# Patient Record
Sex: MALE | URBAN - METROPOLITAN AREA
[De-identification: names, ages, dates, MRNs, and addresses within clinical notes are randomized per-mention and may not be internally consistent; named-entity substitution may affect disease eponyms.]

---

## 2021-03-25 ENCOUNTER — INPATIENT (INPATIENT)
Facility: HOSPITAL | Age: 56
LOS: 4 days | Discharge: HOME | DRG: 246 | End: 2021-03-30
Attending: INTERNAL MEDICINE | Admitting: STUDENT IN AN ORGANIZED HEALTH CARE EDUCATION/TRAINING PROGRAM
Payer: MEDICAID

## 2021-03-25 VITALS
HEART RATE: 106 BPM | WEIGHT: 181.66 LBS | TEMPERATURE: 98 F | SYSTOLIC BLOOD PRESSURE: 129 MMHG | DIASTOLIC BLOOD PRESSURE: 83 MMHG | RESPIRATION RATE: 20 BRPM | HEIGHT: 66 IN | OXYGEN SATURATION: 98 %

## 2021-03-25 DIAGNOSIS — I21.4 NON-ST ELEVATION (NSTEMI) MYOCARDIAL INFARCTION: ICD-10-CM

## 2021-03-25 DIAGNOSIS — Z95.2 PRESENCE OF PROSTHETIC HEART VALVE: Chronic | ICD-10-CM

## 2021-03-25 LAB
A1C WITH ESTIMATED AVERAGE GLUCOSE RESULT: 5.9 % — HIGH (ref 4–5.6)
ALBUMIN SERPL ELPH-MCNC: 4.3 G/DL — SIGNIFICANT CHANGE UP (ref 3.3–5)
ALP SERPL-CCNC: 59 U/L — SIGNIFICANT CHANGE UP (ref 40–120)
ALT FLD-CCNC: 25 U/L — SIGNIFICANT CHANGE UP (ref 10–45)
ANION GAP SERPL CALC-SCNC: 13 MMOL/L — SIGNIFICANT CHANGE UP (ref 5–17)
APTT BLD: >200 SEC — CRITICAL HIGH (ref 27.5–35.5)
AST SERPL-CCNC: 40 U/L — SIGNIFICANT CHANGE UP (ref 10–40)
BILIRUB SERPL-MCNC: 1.2 MG/DL — SIGNIFICANT CHANGE UP (ref 0.2–1.2)
BLD GP AB SCN SERPL QL: NEGATIVE — SIGNIFICANT CHANGE UP
BUN SERPL-MCNC: 13 MG/DL — SIGNIFICANT CHANGE UP (ref 7–23)
CALCIUM SERPL-MCNC: 8.6 MG/DL — SIGNIFICANT CHANGE UP (ref 8.4–10.5)
CHLORIDE SERPL-SCNC: 102 MMOL/L — SIGNIFICANT CHANGE UP (ref 96–108)
CHOLEST SERPL-MCNC: 143 MG/DL — SIGNIFICANT CHANGE UP
CK MB BLD-MCNC: 3 % — SIGNIFICANT CHANGE UP (ref 0–3.5)
CK MB CFR SERPL CALC: 15.6 NG/ML — HIGH (ref 0–6.7)
CK SERPL-CCNC: 518 U/L — HIGH (ref 30–200)
CO2 SERPL-SCNC: 22 MMOL/L — SIGNIFICANT CHANGE UP (ref 22–31)
COVID-19 NUCLEOCAPSID GAM AB INTERP: NEGATIVE — SIGNIFICANT CHANGE UP
COVID-19 NUCLEOCAPSID TOTAL GAM ANTIBODY RESULT: 0.08 INDEX — SIGNIFICANT CHANGE UP
CREAT SERPL-MCNC: 0.77 MG/DL — SIGNIFICANT CHANGE UP (ref 0.5–1.3)
ESTIMATED AVERAGE GLUCOSE: 123 MG/DL — HIGH (ref 68–114)
GLUCOSE BLDC GLUCOMTR-MCNC: 110 MG/DL — HIGH (ref 70–99)
GLUCOSE BLDC GLUCOMTR-MCNC: 124 MG/DL — HIGH (ref 70–99)
GLUCOSE BLDC GLUCOMTR-MCNC: 127 MG/DL — HIGH (ref 70–99)
GLUCOSE SERPL-MCNC: 119 MG/DL — HIGH (ref 70–99)
HCT VFR BLD CALC: 43.7 % — SIGNIFICANT CHANGE UP (ref 39–50)
HDLC SERPL-MCNC: 48 MG/DL — SIGNIFICANT CHANGE UP
HGB BLD-MCNC: 13.6 G/DL — SIGNIFICANT CHANGE UP (ref 13–17)
LACTATE SERPL-SCNC: 1.5 MMOL/L — SIGNIFICANT CHANGE UP (ref 0.7–2)
LIPID PNL WITH DIRECT LDL SERPL: 82 MG/DL — SIGNIFICANT CHANGE UP
MAGNESIUM SERPL-MCNC: 2 MG/DL — SIGNIFICANT CHANGE UP (ref 1.6–2.6)
MCHC RBC-ENTMCNC: 25.4 PG — LOW (ref 27–34)
MCHC RBC-ENTMCNC: 31.1 GM/DL — LOW (ref 32–36)
MCV RBC AUTO: 81.7 FL — SIGNIFICANT CHANGE UP (ref 80–100)
NON HDL CHOLESTEROL: 95 MG/DL — SIGNIFICANT CHANGE UP
NRBC # BLD: 0 /100 WBCS — SIGNIFICANT CHANGE UP (ref 0–0)
PHOSPHATE SERPL-MCNC: 2.5 MG/DL — SIGNIFICANT CHANGE UP (ref 2.5–4.5)
PLATELET # BLD AUTO: 246 K/UL — SIGNIFICANT CHANGE UP (ref 150–400)
POTASSIUM SERPL-MCNC: 3.8 MMOL/L — SIGNIFICANT CHANGE UP (ref 3.5–5.3)
POTASSIUM SERPL-SCNC: 3.8 MMOL/L — SIGNIFICANT CHANGE UP (ref 3.5–5.3)
PROT SERPL-MCNC: 7.3 G/DL — SIGNIFICANT CHANGE UP (ref 6–8.3)
RBC # BLD: 5.35 M/UL — SIGNIFICANT CHANGE UP (ref 4.2–5.8)
RBC # FLD: 14.2 % — SIGNIFICANT CHANGE UP (ref 10.3–14.5)
RH IG SCN BLD-IMP: POSITIVE — SIGNIFICANT CHANGE UP
SARS-COV-2 IGG+IGM SERPL QL IA: 0.08 INDEX — SIGNIFICANT CHANGE UP
SARS-COV-2 IGG+IGM SERPL QL IA: NEGATIVE — SIGNIFICANT CHANGE UP
SARS-COV-2 RNA SPEC QL NAA+PROBE: SIGNIFICANT CHANGE UP
SODIUM SERPL-SCNC: 137 MMOL/L — SIGNIFICANT CHANGE UP (ref 135–145)
TRIGL SERPL-MCNC: 66 MG/DL — SIGNIFICANT CHANGE UP
TROPONIN T, HIGH SENSITIVITY RESULT: 166 NG/L — HIGH (ref 0–51)
TSH SERPL-MCNC: 1.2 UIU/ML — SIGNIFICANT CHANGE UP (ref 0.27–4.2)
WBC # BLD: 7.78 K/UL — SIGNIFICANT CHANGE UP (ref 3.8–10.5)
WBC # FLD AUTO: 7.78 K/UL — SIGNIFICANT CHANGE UP (ref 3.8–10.5)

## 2021-03-25 PROCEDURE — 92978 ENDOLUMINL IVUS OCT C 1ST: CPT | Mod: 26,RC

## 2021-03-25 PROCEDURE — 93458 L HRT ARTERY/VENTRICLE ANGIO: CPT | Mod: 26,59

## 2021-03-25 PROCEDURE — 92941 PRQ TRLML REVSC TOT OCCL AMI: CPT | Mod: RC

## 2021-03-25 PROCEDURE — 99223 1ST HOSP IP/OBS HIGH 75: CPT

## 2021-03-25 PROCEDURE — 93010 ELECTROCARDIOGRAM REPORT: CPT

## 2021-03-25 PROCEDURE — 99291 CRITICAL CARE FIRST HOUR: CPT

## 2021-03-25 PROCEDURE — 92973 PRQ TRLUML C MCHN ASP THRMBC: CPT | Mod: RC

## 2021-03-25 PROCEDURE — 99152 MOD SED SAME PHYS/QHP 5/>YRS: CPT

## 2021-03-25 RX ORDER — HEPARIN SODIUM 5000 [USP'U]/ML
INJECTION INTRAVENOUS; SUBCUTANEOUS
Qty: 25000 | Refills: 0 | Status: DISCONTINUED | OUTPATIENT
Start: 2021-03-25 | End: 2021-03-30

## 2021-03-25 RX ORDER — CHLORHEXIDINE GLUCONATE 213 G/1000ML
1 SOLUTION TOPICAL
Refills: 0 | Status: DISCONTINUED | OUTPATIENT
Start: 2021-03-25 | End: 2021-03-30

## 2021-03-25 RX ORDER — ASPIRIN/CALCIUM CARB/MAGNESIUM 324 MG
81 TABLET ORAL DAILY
Refills: 0 | Status: DISCONTINUED | OUTPATIENT
Start: 2021-03-26 | End: 2021-03-30

## 2021-03-25 RX ORDER — EPTIFIBATIDE 2 MG/ML
1.97 INJECTION, SOLUTION INTRAVENOUS
Qty: 75 | Refills: 0 | Status: DISCONTINUED | OUTPATIENT
Start: 2021-03-25 | End: 2021-03-25

## 2021-03-25 RX ORDER — CLOPIDOGREL BISULFATE 75 MG/1
300 TABLET, FILM COATED ORAL ONCE
Refills: 0 | Status: COMPLETED | OUTPATIENT
Start: 2021-03-26 | End: 2021-03-26

## 2021-03-25 RX ORDER — INSULIN LISPRO 100/ML
VIAL (ML) SUBCUTANEOUS
Refills: 0 | Status: DISCONTINUED | OUTPATIENT
Start: 2021-03-25 | End: 2021-03-30

## 2021-03-25 RX ORDER — POTASSIUM CHLORIDE 20 MEQ
40 PACKET (EA) ORAL ONCE
Refills: 0 | Status: COMPLETED | OUTPATIENT
Start: 2021-03-25 | End: 2021-03-25

## 2021-03-25 RX ORDER — INSULIN LISPRO 100/ML
VIAL (ML) SUBCUTANEOUS AT BEDTIME
Refills: 0 | Status: DISCONTINUED | OUTPATIENT
Start: 2021-03-25 | End: 2021-03-30

## 2021-03-25 RX ORDER — AMIODARONE HYDROCHLORIDE 400 MG/1
200 TABLET ORAL DAILY
Refills: 0 | Status: DISCONTINUED | OUTPATIENT
Start: 2021-03-25 | End: 2021-03-26

## 2021-03-25 RX ORDER — ATORVASTATIN CALCIUM 80 MG/1
80 TABLET, FILM COATED ORAL AT BEDTIME
Refills: 0 | Status: DISCONTINUED | OUTPATIENT
Start: 2021-03-25 | End: 2021-03-30

## 2021-03-25 RX ORDER — CLOPIDOGREL BISULFATE 75 MG/1
75 TABLET, FILM COATED ORAL DAILY
Refills: 0 | Status: DISCONTINUED | OUTPATIENT
Start: 2021-03-27 | End: 2021-03-30

## 2021-03-25 RX ORDER — INSULIN LISPRO 100/ML
VIAL (ML) SUBCUTANEOUS
Refills: 0 | Status: DISCONTINUED | OUTPATIENT
Start: 2021-03-25 | End: 2021-03-25

## 2021-03-25 RX ADMIN — Medication 40 MILLIEQUIVALENT(S): at 17:28

## 2021-03-25 RX ADMIN — ATORVASTATIN CALCIUM 80 MILLIGRAM(S): 80 TABLET, FILM COATED ORAL at 22:27

## 2021-03-25 RX ADMIN — HEPARIN SODIUM 1500 UNIT(S)/HR: 5000 INJECTION INTRAVENOUS; SUBCUTANEOUS at 22:21

## 2021-03-25 RX ADMIN — EPTIFIBATIDE 13 MICROGRAM(S)/KG/MIN: 2 INJECTION, SOLUTION INTRAVENOUS at 14:29

## 2021-03-25 RX ADMIN — AMIODARONE HYDROCHLORIDE 200 MILLIGRAM(S): 400 TABLET ORAL at 15:26

## 2021-03-25 NOTE — CHART NOTE - NSCHARTNOTEFT_GEN_A_CORE
Padua Prediction Score for VTE Risk within 24hours of admission:    Active malignancy:                                                    [  ] YES +3, [ x ] NO   Previous VTE (Excluding Superficial Vein Thrombosis): [  ] YES +3, [ x ] NO  Reduced mobility:                                                     [  ] YES +3, [ x ] NO  Already known thrombophilic condition:                     [  ] YES +3, [ x ] NO  Recent (</=1 month trauma and/or surgery):             [  ] YES +2, [ x ] NO  Elderly age (>/=70):                                                  [  ] YES +1, [ x ] NO  Heart and/or Respiratory Failure:                               [  ] YES +1, [ x ] NO   Acute MI and/or ischemic CVA:                                  [ x ] YES +1, [  ] NO   Acute infection and/or rheumatologic disorder:          [  ] YES +1, [ x ] NO   BMI>/= 30:                                                              [  ] YES +1, [ x ] NO   Ongoing hormonal treatment:                                   [  ] YES +1, [ x ] NO    Total Score: [ 1 ]  points    [ x ] Padua Score <  3: Low Risk of VTE         - Chemical Thromboprophylaxis should be considered on case-by-case basis  [  ] Padua Score >/= 4: High Risk of VTE         - Chemical Thromboprophylaxis is recommended for nonpregnant patients without contraindications (Major bleeding, thrombocytopenia) who are >/=  18 years of age                         VTE Prophylaxis Recommendations:  Mechanical Pneumatic Compression Devices                                [  ]  Yes,  [  ] No, Contraindicated    Chemical VTE Prophylaxis (Heparin/ Lovenox/ Fondaparinux)        [   ] Yes,  [  ] No              [ ] Contraindicated, because _____              [x] Already receiving Systemic Anticoagulation: Heparin gtt to start @ 10pm 3/25

## 2021-03-25 NOTE — H&P ADULT - NSHPREVIEWOFSYSTEMS_GEN_ALL_CORE
REVIEW OF SYSTEMS  --------------------------------------------------------------------------------  Gen: No weight changes, + fatigue, fevers/chills, weakness  Skin: No rashes  Head/Eyes/Ears/Mouth: No headache; Normal hearing; Normal vision w/o blurriness; No sinus pain/discomfort, sore throat  Respiratory: +dyspnea, cough, wheezing, hemoptysis  CV: +chest pain, -PND, -orthopnea  GI: No abdominal pain, diarrhea, constipation, +nausea, vomiting, melena, hematochezia  : No increased frequency, dysuria, hematuria, nocturia  MSK: No joint pain/swelling; no back pain; no edema  Neuro: No dizziness/lightheadedness, weakness, seizures, numbness, tingling  Heme: No easy bruising or bleeding  Endo: No heat/cold intolerance  Psych: No significant nervousness, anxiety, stress, depression

## 2021-03-25 NOTE — H&P ADULT - NSICDXPASTMEDICALHX_GEN_ALL_CORE_FT
PAST MEDICAL HISTORY:  Afib     Diabetes     HLD (hyperlipidemia)     HTN (hypertension)     RHODA on CPAP

## 2021-03-25 NOTE — H&P ADULT - NSHPLABSRESULTS_GEN_ALL_CORE
LABS: LABS:  03-25 @ 14:57 Creatine 518 U/L<H> [30 - 200]  cret                        13.6   7.78  )-----------( 246      ( 25 Mar 2021 14:57 )             43.7     03-25    137  |  102  |  13  ----------------------------<  119<H>  3.8   |  22  |  0.77    Ca    8.6      25 Mar 2021 14:57  Phos  2.5     03-25  Mg     2.0     03-25    TPro  7.3  /  Alb  4.3  /  TBili  1.2  /  DBili  x   /  AST  40  /  ALT  25  /  AlkPhos  59  03-25    Troponin T, High Sensitivity Result: 166: Specimen not hemolyzed CKMB Units: 15.6 ng/mL (03.25.21 @ 14:57)

## 2021-03-25 NOTE — PROGRESS NOTE ADULT - SUBJECTIVE AND OBJECTIVE BOX
HOLLEY TINOCO  MRN-[id number]  Patient is a 56y old  Male who presents with a chief complaint of CAD.    HPI:  The patient is a 56 year old male, PMH HTN, HLD, DM, RHODA on CPAP, RHD s/p mitral valve replacement in 2002 and afib on coumadin, presenting to CCU following a RCA KALIN and thrombectomy. The patient was in his usual state of health until yesterday evening, when he felt very fatigued. This morning when he awoke, he was feeling significant chest pain which he describes as burning. He also felt short of breath and very nauseous, but did not vomit. He took a cab to the [facility] ED. He was found to be tachycardic to 129, EKG with ST elevations in interior leads. He was given morphine, aspirin 325, Brilinta and nitroglycerin. He was transferred to [facility], where was found to have 100% occlusion in the proximal RCA. He received one KALIN and a thrombectomy.  (25 Mar 2021 14:06)      Hospital Course:  3/25 Admitted to the CICU for STEMI S/P KALIN to the proximal RCA and thrombectomy.     24 HOUR EVENTS:  - S/P KALIN to the proximal RCA and thrombectomy today.     REVIEW OF SYSTEMS:   Constitutional: No fevers, or chills  Eyes/ENT: No visual changes  Respiratory: No cough, wheezing, hemoptysis  Cardiovascular: +chest pain, no palpitations  Gastrointestinal: No abdominal pain.  Genitourinary: No dysuria  Neurological: No numbness, no weakness  Skin: No itching, rashes    ICU Vital Signs Last 24 Hrs  T(C): 36.5 (25 Mar 2021 17:00), Max: 36.6 (25 Mar 2021 13:21)  T(F): 97.7 (25 Mar 2021 17:00), Max: 97.9 (25 Mar 2021 13:21)  HR: 104 (25 Mar 2021 18:30) (62 - 124)  BP: 107/75 (25 Mar 2021 18:30) (96/71 - 129/83)  BP(mean): 85 (25 Mar 2021 18:30) (79 - 108)  ABP: --  ABP(mean): --  RR: 23 (25 Mar 2021 18:30) (16 - 24)  SpO2: 99% (25 Mar 2021 18:30) (97% - 99%)    I&O's Summary    25 Mar 2021 07:01  -  25 Mar 2021 18:47  --------------------------------------------------------  IN: 506 mL / OUT: 700 mL / NET: -194 mL    POCT Blood Glucose.: 124 mg/dL (25 Mar 2021 17:18)    PHYSICAL EXAM:   General: No acute distress  Eyes: EOMI, PERRLA, conjunctiva and sclera clear  Chest/Lung: CTAB, no wheezes, rales, or rhonchi  Heart: Regular rate, regular rhythm. Normal S1/S2. No murmurs, rubs, or gallops.  Abdomen: Soft, nontender, nondistended. Normal bowel sounds.  Extremites: 2+ peripheral pulses B/L. No clubbing, cyanosis, or edema.  Neurology: A&O x3, no focal deficits  Skin: No rashes or lesions  ============================I/O===========================   I&O's Detail    25 Mar 2021 07:01  -  25 Mar 2021 18:47  --------------------------------------------------------  IN:    Eptifbatide: 26 mL    Oral Fluid: 480 mL  Total IN: 506 mL    OUT:    Voided (mL): 700 mL  Total OUT: 700 mL    Total NET: -194 mL        ============================ LABS =========================                        13.6   7.78  )-----------( 246      ( 25 Mar 2021 14:57 )             43.7     03-25    137  |  102  |  13  ----------------------------<  119<H>  3.8   |  22  |  0.77    Ca    8.6      25 Mar 2021 14:57  Phos  2.5     03-25  Mg     2.0     03-25    TPro  7.3  /  Alb  4.3  /  TBili  1.2  /  DBili  x   /  AST  40  /  ALT  25  /  AlkPhos  59  03-25    LIVER FUNCTIONS - ( 25 Mar 2021 14:57 )  Alb: 4.3 g/dL / Pro: 7.3 g/dL / ALK PHOS: 59 U/L / ALT: 25 U/L / AST: 40 U/L / GGT: x               ======================Micro/Rad/Cardio=================  Telemetry: Reviewed   EKG: Reviewed  CXR: Reviewed  Cath: Reviewed  ======================================================  PAST MEDICAL & SURGICAL HISTORY:  RHODA on CPAP    HLD (hyperlipidemia)    Diabetes    HTN (hypertension)    Afib    H/O mitral valve replacement      ====================ASSESSMENT ==============  STEMI S/P RCA KALIN and thrombectomy on 3/25  DMT2 w/ Hyperglycemia  AFib    Plan:  ====================== NEUROLOGY=====================  A&Ox3, nonfocal  - Continue to monitor neuro status as per protocol     ==================== RESPIRATORY======================  Comfortable on room air, SpO2 99%  - Continue to monitor SpO2 via pulse oximetry  - Encourage bedside spirometry     ====================CARDIOVASCULAR==================  STEMI  - s/p KALIN to proximal RCA and thrombectomy  - Cont with DAPT: ASA, Brilinta with Lipitor 80mg QHS  - Trop 166, continue to trend CE until downtrending   - F/UP formal TTE results    Afib   - C/w home med Amiodarone 200mg QD  - C/w A/C with Heparin    aMIOdarone    Tablet 200 milliGRAM(s) Oral daily  atorvastatin 80 milliGRAM(s) Oral at bedtime  ===================HEMATOLOGIC/ONC ===================  H/H 13.6/43.7.   - Continue to monitor hemoglobin and hematocrit levels.     VTE prophylaxis   - Continue Heparin gtt  - Monitor PTT levels    heparin  Infusion.  Unit(s)/Hr (15 mL/Hr) IV Continuous <Continuous>  ===================== RENAL =========================  No active issues  - Continue to monitor I/Os, BUN/Creatinine, and urine output.   - Goal net negative fluid balance. Replete lytes PRN. Keep K> 4 and Mg >2.     ==================== GASTROINTESTINAL===================  Tolerating PO DASH/TLC diet.     =======================    ENDOCRINE  =====================  Hx of DMT2  - glycemic control with Admelog sliding scale.   - Monitor blood glucose levels.   - F/UP A1c level    insulin lispro (ADMELOG) corrective regimen sliding scale   SubCutaneous three times a day before meals  ========================INFECTIOUS DISEASE================  Afebrile, WBC within normal limits.   - Monitor temperature and trend WBC.   - Monitor off abx.       Patient requires continuous monitoring with bedside rhythm monitoring, pulse ox monitoring, and intermittent blood gas analysis. Care plan discussed with ICU care team. Patient remained critical and at risk for life threatening decompensation.  Patient seen, examined and plan discussed with CCU team during rounds.     I have personally provided 35 minutes of critical care time excluding time spent on separate procedures.    By signing my name below, I, Sandra Callejas, attest that this documentation has been prepared under the direction and in the presence of OZIEL Gonzalez.  Electronically signed: Eduardo Aparicio, 03-25-21 @ 18:47    I, OZIEL Gonzalez, personally performed the services described in this documentation. all medical record entries made by the scribe were at my direction and in my presence. I have reviewed the chart and agree that the record reflects my personal performance and is accurate and complete  Electronically signed: OZIEL Gonzalez.       HOLLEY TINOCO  MRN-[id number]  Patient is a 56y old  Male who presents with a chief complaint of CAD.    HPI:  The patient is a 56 year old male, PMH HTN, HLD, DM, RHODA on CPAP, RHD s/p mitral valve replacement in 2002 and afib on coumadin, presenting to CCU following a RCA KALIN and thrombectomy. The patient was in his usual state of health until yesterday evening, when he felt very fatigued. This morning when he awoke, he was feeling significant chest pain which he describes as burning. He also felt short of breath and very nauseous, but did not vomit. He took a cab to the [facility] ED. He was found to be tachycardic to 129, EKG with ST elevations in interior leads. He was given morphine, aspirin 325, Brilinta and nitroglycerin. He was transferred to [facility], where was found to have 100% occlusion in the proximal RCA. He received one KALIN and a thrombectomy.  (25 Mar 2021 14:06)      Hospital Course:  3/25 Admitted to the CICU for STEMI S/P KALIN to the proximal RCA    24 HOUR EVENTS:  - S/P KALIN to the proximal RCA and thrombectomy today.     REVIEW OF SYSTEMS:   Constitutional: No fevers, or chills  Eyes/ENT: No visual changes  Respiratory: No cough, wheezing, hemoptysis  Cardiovascular: +chest pain, no palpitations  Gastrointestinal: No abdominal pain.  Genitourinary: No dysuria  Neurological: No numbness, no weakness  Skin: No itching, rashes    ICU Vital Signs Last 24 Hrs  T(C): 36.5 (25 Mar 2021 17:00), Max: 36.6 (25 Mar 2021 13:21)  T(F): 97.7 (25 Mar 2021 17:00), Max: 97.9 (25 Mar 2021 13:21)  HR: 104 (25 Mar 2021 18:30) (62 - 124)  BP: 107/75 (25 Mar 2021 18:30) (96/71 - 129/83)  BP(mean): 85 (25 Mar 2021 18:30) (79 - 108)  ABP: --  ABP(mean): --  RR: 23 (25 Mar 2021 18:30) (16 - 24)  SpO2: 99% (25 Mar 2021 18:30) (97% - 99%)    I&O's Summary    25 Mar 2021 07:01  -  25 Mar 2021 18:47  --------------------------------------------------------  IN: 506 mL / OUT: 700 mL / NET: -194 mL    POCT Blood Glucose.: 124 mg/dL (25 Mar 2021 17:18)    PHYSICAL EXAM:   General: No acute distress  Eyes: EOMI, PERRLA, conjunctiva and sclera clear  Chest/Lung: CTAB, no wheezes, rales, or rhonchi  Heart: Regular rate, regular rhythm. Normal S1/S2. No murmurs, rubs, or gallops.  Abdomen: Soft, nontender, nondistended. Normal bowel sounds.  Extremites: 2+ peripheral pulses B/L. No clubbing, cyanosis, or edema.  Neurology: A&O x3, no focal deficits  Skin: No rashes or lesions  ============================I/O===========================   I&O's Detail    25 Mar 2021 07:01  -  25 Mar 2021 18:47  --------------------------------------------------------  IN:    Eptifbatide: 26 mL    Oral Fluid: 480 mL  Total IN: 506 mL    OUT:    Voided (mL): 700 mL  Total OUT: 700 mL    Total NET: -194 mL        ============================ LABS =========================                        13.6   7.78  )-----------( 246      ( 25 Mar 2021 14:57 )             43.7     03-25    137  |  102  |  13  ----------------------------<  119<H>  3.8   |  22  |  0.77    Ca    8.6      25 Mar 2021 14:57  Phos  2.5     03-25  Mg     2.0     03-25    TPro  7.3  /  Alb  4.3  /  TBili  1.2  /  DBili  x   /  AST  40  /  ALT  25  /  AlkPhos  59  03-25    LIVER FUNCTIONS - ( 25 Mar 2021 14:57 )  Alb: 4.3 g/dL / Pro: 7.3 g/dL / ALK PHOS: 59 U/L / ALT: 25 U/L / AST: 40 U/L / GGT: x               ======================Micro/Rad/Cardio=================  Telemetry: Reviewed   EKG: Reviewed  CXR: Reviewed  Cath: Reviewed  ======================================================  PAST MEDICAL & SURGICAL HISTORY:  RHODA on CPAP    HLD (hyperlipidemia)    Diabetes    HTN (hypertension)    Afib    H/O mitral valve replacement      ====================ASSESSMENT ==============  STEMI S/P RCA KALIN and thrombectomy on 3/25  DMT2 w/ Hyperglycemia  AFib    Plan:  ====================== NEUROLOGY=====================  A&Ox3, nonfocal  - Continue to monitor neuro status as per protocol     ==================== RESPIRATORY======================  Comfortable on room air, SpO2 99%  - Continue to monitor SpO2 via pulse oximetry  - Encourage bedside spirometry     ====================CARDIOVASCULAR==================  STEMI  - s/p KALIN to proximal RCA and thrombectomy  - Cont with DAPT: ASA, Brilinta with Lipitor 80mg QHS  - Trop 166, continue to trend CE until downtrending   - F/UP formal TTE results    Afib   - C/w home med Amiodarone 200mg QD  - C/w A/C with Heparin    aMIOdarone    Tablet 200 milliGRAM(s) Oral daily  atorvastatin 80 milliGRAM(s) Oral at bedtime  ===================HEMATOLOGIC/ONC ===================  H/H 13.6/43.7.   - Continue to monitor hemoglobin and hematocrit levels.     VTE prophylaxis   - Continue Heparin gtt  - Monitor PTT levels    heparin  Infusion.  Unit(s)/Hr (15 mL/Hr) IV Continuous <Continuous>  ===================== RENAL =========================  No active issues  - Continue to monitor I/Os, BUN/Creatinine, and urine output.   - Goal net negative fluid balance. Replete lytes PRN. Keep K> 4 and Mg >2.     ==================== GASTROINTESTINAL===================  Tolerating PO DASH/TLC diet.     =======================    ENDOCRINE  =====================  Hx of DMT2  - glycemic control with Admelog sliding scale.   - Monitor blood glucose levels.   - F/UP A1c level    insulin lispro (ADMELOG) corrective regimen sliding scale   SubCutaneous three times a day before meals  ========================INFECTIOUS DISEASE================  Afebrile, WBC within normal limits.   - Monitor temperature and trend WBC.   - Monitor off abx.       Patient requires continuous monitoring with bedside rhythm monitoring, pulse ox monitoring, and intermittent blood gas analysis. Care plan discussed with ICU care team. Patient remained critical and at risk for life threatening decompensation.  Patient seen, examined and plan discussed with CCU team during rounds.     I have personally provided 35 minutes of critical care time excluding time spent on separate procedures.    By signing my name below, I, Sandra Callejas, attest that this documentation has been prepared under the direction and in the presence of OZIEL Gonzalez.  Electronically signed: Eduardo Aparicio, 03-25-21 @ 18:47    I, OZIEL Gonzalez, personally performed the services described in this documentation. all medical record entries made by the jessicaibe were at my direction and in my presence. I have reviewed the chart and agree that the record reflects my personal performance and is accurate and complete  Electronically signed: OZIEL Gonzalez.       HOLLEY TINOCO  MRN-[id number]  Patient is a 56y old  Male who presents with a chief complaint of CAD.    HPI:  The patient is a 56 year old male, PMH HTN, HLD, DM, RHODA on CPAP, RHD s/p mitral valve replacement in 2002 and afib on coumadin, presenting to CCU following a RCA KALIN and thrombectomy. The patient was in his usual state of health until yesterday evening, when he felt very fatigued. This morning when he awoke, he was feeling significant chest pain which he describes as burning. He also felt short of breath and very nauseous, but did not vomit. He took a cab to the [facility] ED. He was found to be tachycardic to 129, EKG with ST elevations in interior leads. He was given morphine, aspirin 325, Brilinta and nitroglycerin. He was transferred to [facility], where was found to have 100% occlusion in the proximal RCA. He received one KALIN and a thrombectomy.  (25 Mar 2021 14:06)      Hospital Course:  3/25 Admitted to the CICU for STEMI S/P KALIN to the proximal RCA    24 HOUR EVENTS:  - S/P KALIN to the proximal RCA and thrombectomy today.     REVIEW OF SYSTEMS:   Constitutional: No fevers, or chills  Respiratory: No cough, wheezing, hemoptysis  Cardiovascular: chest pain, no palpitations  Gastrointestinal: No abdominal pain.  Genitourinary: No dysuria  Neurological: No numbness, no weakness  Skin: No itching, rashes    ICU Vital Signs Last 24 Hrs  T(C): 36.5 (25 Mar 2021 17:00), Max: 36.6 (25 Mar 2021 13:21)  T(F): 97.7 (25 Mar 2021 17:00), Max: 97.9 (25 Mar 2021 13:21)  HR: 104 (25 Mar 2021 18:30) (62 - 124)  BP: 107/75 (25 Mar 2021 18:30) (96/71 - 129/83)  BP(mean): 85 (25 Mar 2021 18:30) (79 - 108)  ABP: --  ABP(mean): --  RR: 23 (25 Mar 2021 18:30) (16 - 24)  SpO2: 99% (25 Mar 2021 18:30) (97% - 99%)    I&O's Summary    25 Mar 2021 07:01  -  25 Mar 2021 18:47  --------------------------------------------------------  IN: 506 mL / OUT: 700 mL / NET: -194 mL    POCT Blood Glucose.: 124 mg/dL (25 Mar 2021 17:18)    PHYSICAL EXAM:   General: No acute distress  Chest/Lung: CTAB, no wheezes, rales, or rhonchi  Heart: Aflutter low 100s  Normal S1/S2. No murmurs, rubs, or gallops.  Abdomen: Soft, nontender, nondistended. Normal bowel sounds.  Extremites: R Femoral access site s/p sheath pull, no hematoma, ecchymosis. Dressing c/d/i. 2+ peripheral pulses B/L. No clubbing, cyanosis, or edema.  Neurology: A&O x3, no focal deficits  Skin: No rashes or lesions  ============================I/O===========================   I&O's Detail    25 Mar 2021 07:01  -  25 Mar 2021 18:47  --------------------------------------------------------  IN:    Eptifbatide: 26 mL    Oral Fluid: 480 mL  Total IN: 506 mL    OUT:    Voided (mL): 700 mL  Total OUT: 700 mL    Total NET: -194 mL        ============================ LABS =========================                        13.6   7.78  )-----------( 246      ( 25 Mar 2021 14:57 )             43.7     03-25    137  |  102  |  13  ----------------------------<  119<H>  3.8   |  22  |  0.77    Ca    8.6      25 Mar 2021 14:57  Phos  2.5     03-25  Mg     2.0     03-25    TPro  7.3  /  Alb  4.3  /  TBili  1.2  /  DBili  x   /  AST  40  /  ALT  25  /  AlkPhos  59  03-25    LIVER FUNCTIONS - ( 25 Mar 2021 14:57 )  Alb: 4.3 g/dL / Pro: 7.3 g/dL / ALK PHOS: 59 U/L / ALT: 25 U/L / AST: 40 U/L / GGT: x               ======================Micro/Rad/Cardio=================  Telemetry: Reviewed   EKG: Reviewed  CXR: Reviewed  Cath: Reviewed  ======================================================  PAST MEDICAL & SURGICAL HISTORY:  RHODA on CPAP    HLD (hyperlipidemia)    Diabetes    HTN (hypertension)    Afib    H/O mitral valve replacement      ====================ASSESSMENT ==============  STEMI S/P RCA KALIN and thrombectomy on 3/25  DMT2 w/ Hyperglycemia  AFib    Plan:  ====================== NEUROLOGY=====================  A&Ox3, nonfocal  - Continue to monitor neuro status as per protocol     ==================== RESPIRATORY======================  Comfortable on room air, SpO2 99%  - Hx of RHODA on CPAP @ home x5yrs, has had machine malfunction and has not used in past 2 mths. Does not wish to use the hospital equipment overnight.    - Continue to monitor SpO2 via pulse oximetry    ====================CARDIOVASCULAR==================  IWSTEMI  - [facility] 3/25: pRCA 100% KALIN x1 + integrillin gtt   - Cont with DAPT: ASA, and reload on plavix in AM in setting of Triple Therapy (Mechanical Valve)  - High dose statin daily   - GDMT as tolerated (BB, ACEi or ARB)   - TTE, Trend CE     Mechanical Mitral Valve Replacement 2002   - On coumadin @ home  - now s/p MI, will require triple therapy -> ASA/Plavix/Coumadin upon d/c  - Maintain on heparin gtt overnight 3/25    Afib   - C/w home med Amiodarone 200mg QD  - BB as BP tolerates  - C/w A/C with Heparin    aMIOdarone    Tablet 200 milliGRAM(s) Oral daily  atorvastatin 80 milliGRAM(s) Oral at bedtime  ===================HEMATOLOGIC/ONC ===================  H/H 13.6/43.7.   - Continue to monitor hemoglobin and hematocrit levels.     VTE prophylaxis   - Continue Heparin gtt  - Monitor PTT levels    heparin  Infusion.  Unit(s)/Hr (15 mL/Hr) IV Continuous <Continuous>  ===================== RENAL =========================  No active issues  - Continue to monitor I/Os, BUN/Creatinine, and urine output.   - Goal net negative fluid balance. Replete lytes PRN. Keep K> 4 and Mg >2.     ==================== GASTROINTESTINAL===================  PO DASH/TLC diet.   Bowel regimen PRN    =======================    ENDOCRINE  =====================  Hx of DMT2  - glycemic control with Admelog sliding scale.   - Monitor blood glucose levels.   - A1c 5.9     TSH normal    insulin lispro (ADMELOG) corrective regimen sliding scale   SubCutaneous three times a day before meals  ========================INFECTIOUS DISEASE================  Afebrile, WBC within normal limits.   - Monitor temperature and trend WBC.   - Monitor off abx.       Patient requires continuous monitoring with bedside rhythm monitoring, pulse ox monitoring, and intermittent blood gas analysis. Care plan discussed with ICU care team. Patient remained critical and at risk for life threatening decompensation.  Patient seen, examined and plan discussed with CCU team during rounds.     I have personally provided 35 minutes of critical care time excluding time spent on separate procedures.    By signing my name below, I, Sandra Callejas, attest that this documentation has been prepared under the direction and in the presence of OZIEL Gonzalez.  Electronically signed: Eduardo Aparicio, 03-25-21 @ 18:47    I, OZIEL Gonzalez, personally performed the services described in this documentation. all medical record entries made by the scribe were at my direction and in my presence. I have reviewed the chart and agree that the record reflects my personal performance and is accurate and complete  Electronically signed: OZIEL Gonzalez.

## 2021-03-25 NOTE — H&P ADULT - ASSESSMENT
====================ASSESSMENT AND PLAN==============  56 year old male, PMH HTN, HLD, DM, RHODA on CPAP, RHD s/p mitral valve replacement in 2002 and afib on coumadin, found to have a STEMI ,now s/p RCA KALIN and thrombectomy.     ====================CARDIOVASCULAR==================    #STEMI  -s/p KALIN to proximal RCA  -continue with aspirin, Brilinta Integrelin   -c/w Lipitor 80  -FU TTE     #Afib   -holding amiodarone and warfarin     ====================== NEUROLOGY=====================  -AOx3  -no active issues  -continue to monitor    ==================== RESPIRATORY======================  -O2 sat 99% on room air  -continue to monitor       ===================== RENAL =========================    03-25-21 @ 07:01  -  03-25-21 @ 14:32  --------------------------------------------------------  IN: 26 mL / OUT: 500 mL / NET: -474 mL    -no active issues     ==================== GASTROINTESTINAL===================  -DASH/TLC diet     ========================INFECTIOUS DISEASE================  T(C): 36.6 (03-25-21 @ 13:21), Max: 36.6 (03-25-21 @ 13:21)    -no active issues       ===================HEMATOLOGIC/ONC ===================    DVT prophylaxis:     eptifibatide Infusion 75 mg/100 mL 1.972 MICROgram(s)/kG/Min (13 mL/Hr) IV Continuous <Continuous>    =======================    ENDOCRINE  =====================    -Hx DM  -FU A1C  -C/W ISS     atorvastatin 80 milliGRAM(s) Oral at bedtime    ======================= LINES/TUBES  =====================  [place]: (Date placed)  Central Line: (Date placed)  HD Catheter: (Date placed)  Arterial Line: (Date placed)  Endotracheal Tube: (Date placed)  Self: (Date placed)     ====================ASSESSMENT AND PLAN==============  56 year old male, PMH HTN, HLD, DM, RHODA on CPAP, RHD s/p mitral valve replacement in 2002 and afib on coumadin, found to have a STEMI ,now s/p RCA KALIN and thrombectomy.     ====================CARDIOVASCULAR==================    #STEMI  -s/p KALIN to proximal RCA  -continue with aspirin, Brilinta.   -Heparin full anticoagulation   -c/w Lipitor 80  -FU TTE     #Afib   -continue home amiodarone  -A/C w heparin     ====================== NEUROLOGY=====================  -AOx3  -no active issues  -continue to monitor    ==================== RESPIRATORY======================  -O2 sat 99% on room air  -continue to monitor     ===================== RENAL =========================    03-25-21 @ 07:01  -  03-25-21 @ 14:32  --------------------------------------------------------  IN: 26 mL / OUT: 500 mL / NET: -474 mL    -no active issues     ==================== GASTROINTESTINAL===================  -DASH/TLC diet     ========================INFECTIOUS DISEASE================  T(C): 36.6 (03-25-21 @ 13:21), Max: 36.6 (03-25-21 @ 13:21)    -no active issues       ===================HEMATOLOGIC/ONC ===================    DVT prophylaxis:   -full dose heparin AC     =======================    ENDOCRINE  =====================    -Hx DM  -FU A1C  -C/W ISS     atorvastatin 80 milliGRAM(s) Oral at bedtime    ======================= LINES/TUBES  =====================  [employer]: (Date placed)  Central Line: (Date placed)  HD Catheter: (Date placed)  Arterial Line: (Date placed)  Endotracheal Tube: (Date placed)  Self: (Date placed)     ====================ASSESSMENT AND PLAN==============  56 year old male, PMH HTN, HLD, DM, RHODA on CPAP, RHD s/p mitral valve replacement in 2002 and afib on coumadin, found to have a STEMI ,now s/p RCA KALIN and thrombectomy.     ====================CARDIOVASCULAR==================    #STEMI  -s/p KALIN to proximal RCA  -continue with aspirin, Brilinta.   -Heparin full anticoagulation   -c/w Lipitor 80  -FU TTE   -Trop 166, continue to trend until downtrending     #Afib   -continue home amiodarone  -A/C w heparin     ====================== NEUROLOGY=====================  -AOx3  -no active issues  -continue to monitor    ==================== RESPIRATORY======================  -O2 sat 99% on room air  -continue to monitor     ===================== RENAL =========================    03-25-21 @ 07:01  -  03-25-21 @ 14:32  --------------------------------------------------------  IN: 26 mL / OUT: 500 mL / NET: -474 mL    -no active issues     ==================== GASTROINTESTINAL===================  -DASH/TLC diet     ========================INFECTIOUS DISEASE================  T(C): 36.6 (03-25-21 @ 13:21), Max: 36.6 (03-25-21 @ 13:21)    -no active issues       ===================HEMATOLOGIC/ONC ===================  Hgb WNL, no active issues   DVT prophylaxis:   -full dose heparin AC     =======================    ENDOCRINE  =====================  -Hx DM2, on metformin at home   -FU A1C  -C/W ISS     atorvastatin 80 milliGRAM(s) Oral at bedtime

## 2021-03-25 NOTE — CHART NOTE - NSCHARTNOTEFT_GEN_A_CORE
Removal of Femoral Sheath    Pulses in the right lower extremity are palpable. The patient was placed in the supine position. The insertion site was identified and the sutures were removed per protocol.  The 6 [place] femoral sheath was then removed. Direct pressure was applied for 20 minutes.     Monitoring of the right groin and both lower extremities including neuro-vascular checks and vital signs every 15 minutes x 4, then every 30 minutes x 2, then every 1 hour was ordered.    Complications: None      Lesa Fry [facility] [phone]

## 2021-03-25 NOTE — H&P ADULT - NSHPPHYSICALEXAM_GEN_ALL_CORE
T(C): 36.6 (03-25-21 @ 13:21), Max: 36.6 (03-25-21 @ 13:21)  HR: 124 (03-25-21 @ 14:00) (106 - 124)  BP: 120/98 (03-25-21 @ 14:00) (120/95 - 129/83)  RR: 16 (03-25-21 @ 14:00) (16 - 20)  SpO2: 99% (03-25-21 @ 14:00) (98% - 99%)    GENERAL: patient appears well, no acute distress, appropriate, pleasant  EYES: sclera clear, no exudates  ENMT: oropharynx clear without erythema, no exudates, moist mucous membranes  NECK: supple, soft, no thyromegaly noted  LUNGS: good air entry bilaterally, clear to auscultation, symmetric breath sounds, no wheezing or rhonchi appreciated  HEART: soft S1/S2, regular rate and rhythm, no murmurs noted, no lower extremity edema  GASTROINTESTINAL: abdomen is soft, nontender, nondistended, normoactive bowel sounds, no palpable masses  INTEGUMENT: good skin turgor, no lesions noted  MUSCULOSKELETAL: no clubbing or cyanosis, no obvious deformity  NEUROLOGIC: awake, alert, oriented x3, good muscle tone in 4 extremities, no obvious sensory deficits  PSYCHIATRIC: mood is good, affect is congruent, linear and logical thought process  HEME/LYMPH: no palpable supraclavicular nodules, no obvious ecchymosis or petechiae

## 2021-03-25 NOTE — H&P ADULT - ATTENDING COMMENTS
55 yo man with HTN, HLD, s/p mech MVR, afib p/w STEMI s/p PCI     Continue DAPT   Start Heparin drip in 6 hours after sheath removal  Check HbA1C, lipid profile, TSH  Continue high intensity statin

## 2021-03-25 NOTE — H&P ADULT - HISTORY OF PRESENT ILLNESS
The patient is a 56 year old male, PMH HTN, HLD, DM, RHODA on CPAP, RHD s/p mitral valve replacement in 2002 and afib on coumadin, presenting to CCU following a RCA KALIN and thrombectomy. The patient was in his usual state of health until yesterday evening, when he felt very fatigued. This morning when he awoke, he was feeling significant chest pain which he describes as burning. He also felt short of breath and very nauseous, but did not vomit. He took a cab to the [facility] ED. He was found to be tachycardic to 129, EKG with ST elevations in interior leads. He was given morphine, aspirin 325, Brilinta and nitroglycerin. He was transferred to [facility], where was found to have 100% occlusion in the proximal RCA. He received one KALIN and a thrombectomy.

## 2021-03-26 DIAGNOSIS — E11.69 TYPE 2 DIABETES MELLITUS WITH OTHER SPECIFIED COMPLICATION: ICD-10-CM

## 2021-03-26 DIAGNOSIS — I21.11 ST ELEVATION (STEMI) MYOCARDIAL INFARCTION INVOLVING RIGHT CORONARY ARTERY: ICD-10-CM

## 2021-03-26 DIAGNOSIS — G47.33 OBSTRUCTIVE SLEEP APNEA (ADULT) (PEDIATRIC): ICD-10-CM

## 2021-03-26 DIAGNOSIS — I48.11 LONGSTANDING PERSISTENT ATRIAL FIBRILLATION: ICD-10-CM

## 2021-03-26 DIAGNOSIS — I10 ESSENTIAL (PRIMARY) HYPERTENSION: ICD-10-CM

## 2021-03-26 LAB
ALBUMIN SERPL ELPH-MCNC: 3.9 G/DL — SIGNIFICANT CHANGE UP (ref 3.3–5)
ALP SERPL-CCNC: 52 U/L — SIGNIFICANT CHANGE UP (ref 40–120)
ALT FLD-CCNC: 24 U/L — SIGNIFICANT CHANGE UP (ref 10–45)
ANION GAP SERPL CALC-SCNC: 11 MMOL/L — SIGNIFICANT CHANGE UP (ref 5–17)
APTT BLD: 117 SEC — HIGH (ref 27.5–35.5)
APTT BLD: 131.9 SEC — CRITICAL HIGH (ref 27.5–35.5)
APTT BLD: >200 SEC — CRITICAL HIGH (ref 27.5–35.5)
AST SERPL-CCNC: 68 U/L — HIGH (ref 10–40)
BASOPHILS # BLD AUTO: 0.02 K/UL — SIGNIFICANT CHANGE UP (ref 0–0.2)
BASOPHILS NFR BLD AUTO: 0.3 % — SIGNIFICANT CHANGE UP (ref 0–2)
BILIRUB SERPL-MCNC: 0.8 MG/DL — SIGNIFICANT CHANGE UP (ref 0.2–1.2)
BUN SERPL-MCNC: 14 MG/DL — SIGNIFICANT CHANGE UP (ref 7–23)
CALCIUM SERPL-MCNC: 8.6 MG/DL — SIGNIFICANT CHANGE UP (ref 8.4–10.5)
CHLORIDE SERPL-SCNC: 105 MMOL/L — SIGNIFICANT CHANGE UP (ref 96–108)
CK MB BLD-MCNC: 4.8 % — HIGH (ref 0–3.5)
CK MB BLD-MCNC: 5.4 % — HIGH (ref 0–3.5)
CK MB CFR SERPL CALC: 27.9 NG/ML — HIGH (ref 0–6.7)
CK MB CFR SERPL CALC: 35.1 NG/ML — HIGH (ref 0–6.7)
CK SERPL-CCNC: 579 U/L — HIGH (ref 30–200)
CK SERPL-CCNC: 656 U/L — HIGH (ref 30–200)
CO2 SERPL-SCNC: 22 MMOL/L — SIGNIFICANT CHANGE UP (ref 22–31)
COVID-19 SPIKE DOMAIN AB INTERP: NEGATIVE — SIGNIFICANT CHANGE UP
COVID-19 SPIKE DOMAIN ANTIBODY RESULT: 0.4 U/ML — SIGNIFICANT CHANGE UP
CREAT SERPL-MCNC: 0.99 MG/DL — SIGNIFICANT CHANGE UP (ref 0.5–1.3)
EOSINOPHIL # BLD AUTO: 0.14 K/UL — SIGNIFICANT CHANGE UP (ref 0–0.5)
EOSINOPHIL NFR BLD AUTO: 1.9 % — SIGNIFICANT CHANGE UP (ref 0–6)
GLUCOSE BLDC GLUCOMTR-MCNC: 105 MG/DL — HIGH (ref 70–99)
GLUCOSE BLDC GLUCOMTR-MCNC: 130 MG/DL — HIGH (ref 70–99)
GLUCOSE BLDC GLUCOMTR-MCNC: 136 MG/DL — HIGH (ref 70–99)
GLUCOSE BLDC GLUCOMTR-MCNC: 142 MG/DL — HIGH (ref 70–99)
GLUCOSE SERPL-MCNC: 122 MG/DL — HIGH (ref 70–99)
HCT VFR BLD CALC: 41 % — SIGNIFICANT CHANGE UP (ref 39–50)
HCT VFR BLD CALC: 42.7 % — SIGNIFICANT CHANGE UP (ref 39–50)
HGB BLD-MCNC: 12.9 G/DL — LOW (ref 13–17)
HGB BLD-MCNC: 13.6 G/DL — SIGNIFICANT CHANGE UP (ref 13–17)
IMM GRANULOCYTES NFR BLD AUTO: 0.3 % — SIGNIFICANT CHANGE UP (ref 0–1.5)
LACTATE SERPL-SCNC: 1.4 MMOL/L — SIGNIFICANT CHANGE UP (ref 0.7–2)
LYMPHOCYTES # BLD AUTO: 1.36 K/UL — SIGNIFICANT CHANGE UP (ref 1–3.3)
LYMPHOCYTES # BLD AUTO: 18.4 % — SIGNIFICANT CHANGE UP (ref 13–44)
MAGNESIUM SERPL-MCNC: 2 MG/DL — SIGNIFICANT CHANGE UP (ref 1.6–2.6)
MCHC RBC-ENTMCNC: 25.7 PG — LOW (ref 27–34)
MCHC RBC-ENTMCNC: 26.2 PG — LOW (ref 27–34)
MCHC RBC-ENTMCNC: 31.5 GM/DL — LOW (ref 32–36)
MCHC RBC-ENTMCNC: 31.9 GM/DL — LOW (ref 32–36)
MCV RBC AUTO: 81.7 FL — SIGNIFICANT CHANGE UP (ref 80–100)
MCV RBC AUTO: 82.3 FL — SIGNIFICANT CHANGE UP (ref 80–100)
MONOCYTES # BLD AUTO: 0.75 K/UL — SIGNIFICANT CHANGE UP (ref 0–0.9)
MONOCYTES NFR BLD AUTO: 10.1 % — SIGNIFICANT CHANGE UP (ref 2–14)
NEUTROPHILS # BLD AUTO: 5.12 K/UL — SIGNIFICANT CHANGE UP (ref 1.8–7.4)
NEUTROPHILS NFR BLD AUTO: 69 % — SIGNIFICANT CHANGE UP (ref 43–77)
NRBC # BLD: 0 /100 WBCS — SIGNIFICANT CHANGE UP (ref 0–0)
NRBC # BLD: 0 /100 WBCS — SIGNIFICANT CHANGE UP (ref 0–0)
PHOSPHATE SERPL-MCNC: 2.8 MG/DL — SIGNIFICANT CHANGE UP (ref 2.5–4.5)
PLATELET # BLD AUTO: 229 K/UL — SIGNIFICANT CHANGE UP (ref 150–400)
PLATELET # BLD AUTO: 235 K/UL — SIGNIFICANT CHANGE UP (ref 150–400)
POTASSIUM SERPL-MCNC: 3.8 MMOL/L — SIGNIFICANT CHANGE UP (ref 3.5–5.3)
POTASSIUM SERPL-SCNC: 3.8 MMOL/L — SIGNIFICANT CHANGE UP (ref 3.5–5.3)
PROT SERPL-MCNC: 6.7 G/DL — SIGNIFICANT CHANGE UP (ref 6–8.3)
RBC # BLD: 5.02 M/UL — SIGNIFICANT CHANGE UP (ref 4.2–5.8)
RBC # BLD: 5.19 M/UL — SIGNIFICANT CHANGE UP (ref 4.2–5.8)
RBC # FLD: 14.3 % — SIGNIFICANT CHANGE UP (ref 10.3–14.5)
RBC # FLD: 14.4 % — SIGNIFICANT CHANGE UP (ref 10.3–14.5)
SARS-COV-2 IGG+IGM SERPL QL IA: 0.4 U/ML — SIGNIFICANT CHANGE UP
SARS-COV-2 IGG+IGM SERPL QL IA: NEGATIVE — SIGNIFICANT CHANGE UP
SODIUM SERPL-SCNC: 138 MMOL/L — SIGNIFICANT CHANGE UP (ref 135–145)
TROPONIN T, HIGH SENSITIVITY RESULT: 573 NG/L — HIGH (ref 0–51)
TROPONIN T, HIGH SENSITIVITY RESULT: 749 NG/L — HIGH (ref 0–51)
WBC # BLD: 7.41 K/UL — SIGNIFICANT CHANGE UP (ref 3.8–10.5)
WBC # BLD: 9.23 K/UL — SIGNIFICANT CHANGE UP (ref 3.8–10.5)
WBC # FLD AUTO: 7.41 K/UL — SIGNIFICANT CHANGE UP (ref 3.8–10.5)
WBC # FLD AUTO: 9.23 K/UL — SIGNIFICANT CHANGE UP (ref 3.8–10.5)

## 2021-03-26 PROCEDURE — 93306 TTE W/DOPPLER COMPLETE: CPT | Mod: 26

## 2021-03-26 PROCEDURE — 71045 X-RAY EXAM CHEST 1 VIEW: CPT | Mod: 26

## 2021-03-26 PROCEDURE — 99233 SBSQ HOSP IP/OBS HIGH 50: CPT

## 2021-03-26 PROCEDURE — 99221 1ST HOSP IP/OBS SF/LOW 40: CPT

## 2021-03-26 PROCEDURE — 93010 ELECTROCARDIOGRAM REPORT: CPT

## 2021-03-26 RX ORDER — METOPROLOL TARTRATE 50 MG
12.5 TABLET ORAL ONCE
Refills: 0 | Status: COMPLETED | OUTPATIENT
Start: 2021-03-26 | End: 2021-03-26

## 2021-03-26 RX ORDER — POTASSIUM CHLORIDE 20 MEQ
20 PACKET (EA) ORAL ONCE
Refills: 0 | Status: COMPLETED | OUTPATIENT
Start: 2021-03-26 | End: 2021-03-26

## 2021-03-26 RX ORDER — METOPROLOL TARTRATE 50 MG
5 TABLET ORAL EVERY 8 HOURS
Refills: 0 | Status: DISCONTINUED | OUTPATIENT
Start: 2021-03-26 | End: 2021-03-30

## 2021-03-26 RX ORDER — AMIODARONE HYDROCHLORIDE 400 MG/1
150 TABLET ORAL ONCE
Refills: 0 | Status: COMPLETED | OUTPATIENT
Start: 2021-03-26 | End: 2021-03-26

## 2021-03-26 RX ORDER — METOPROLOL TARTRATE 50 MG
25 TABLET ORAL
Refills: 0 | Status: DISCONTINUED | OUTPATIENT
Start: 2021-03-26 | End: 2021-03-27

## 2021-03-26 RX ORDER — PANTOPRAZOLE SODIUM 20 MG/1
40 TABLET, DELAYED RELEASE ORAL
Refills: 0 | Status: DISCONTINUED | OUTPATIENT
Start: 2021-03-26 | End: 2021-03-30

## 2021-03-26 RX ORDER — METOPROLOL TARTRATE 50 MG
12.5 TABLET ORAL EVERY 12 HOURS
Refills: 0 | Status: DISCONTINUED | OUTPATIENT
Start: 2021-03-26 | End: 2021-03-26

## 2021-03-26 RX ADMIN — HEPARIN SODIUM 1000 UNIT(S)/HR: 5000 INJECTION INTRAVENOUS; SUBCUTANEOUS at 19:17

## 2021-03-26 RX ADMIN — AMIODARONE HYDROCHLORIDE 600 MILLIGRAM(S): 400 TABLET ORAL at 07:39

## 2021-03-26 RX ADMIN — ATORVASTATIN CALCIUM 80 MILLIGRAM(S): 80 TABLET, FILM COATED ORAL at 21:34

## 2021-03-26 RX ADMIN — Medication 20 MILLIEQUIVALENT(S): at 01:51

## 2021-03-26 RX ADMIN — Medication 81 MILLIGRAM(S): at 11:53

## 2021-03-26 RX ADMIN — HEPARIN SODIUM 1200 UNIT(S)/HR: 5000 INJECTION INTRAVENOUS; SUBCUTANEOUS at 10:48

## 2021-03-26 RX ADMIN — Medication 12.5 MILLIGRAM(S): at 05:15

## 2021-03-26 RX ADMIN — CLOPIDOGREL BISULFATE 300 MILLIGRAM(S): 75 TABLET, FILM COATED ORAL at 05:15

## 2021-03-26 RX ADMIN — AMIODARONE HYDROCHLORIDE 200 MILLIGRAM(S): 400 TABLET ORAL at 05:15

## 2021-03-26 RX ADMIN — Medication 25 MILLIGRAM(S): at 17:32

## 2021-03-26 RX ADMIN — HEPARIN SODIUM 0 UNIT(S)/HR: 5000 INJECTION INTRAVENOUS; SUBCUTANEOUS at 09:42

## 2021-03-26 RX ADMIN — Medication 12.5 MILLIGRAM(S): at 01:51

## 2021-03-26 NOTE — CHART NOTE - NSCHARTNOTEFT_GEN_A_CORE
Transfer from: CCU  Transfer to:  (  ) Medicine    ( x ) Telemetry    (  ) RCU    (  ) Palliative    (  ) Stroke Unit    (  ) _______________  Accepting physician: Dr. Carrillo     HPI: The patient is a 56 year old male, PMH HTN, HLD, DM, RHODA on CPAP, RHD s/p mitral valve replacement in 2002 and afib on coumadin, presenting to CCU following a RCA KALIN and thrombectomy. The patient was in his usual state of health until yesterday evening, when he felt very fatigued. This morning when he awoke, he was feeling significant chest pain which he describes as burning. He also felt short of breath and very nauseous, but did not vomit. He took a cab to the [facility] ED. He was found to be tachycardic to 129, EKG with ST elevations in interior leads. He was given morphine, aspirin 325, Brilinta and nitroglycerin. He was transferred to [facility], where was found to have 100% occlusion in the proximal RCA. He received one KALIN and a thrombectomy.       For Follow-Up:  [] ONEYDA  [] NPO @ midnight Sunday for ONEYDA and DCCV Monday  [] continue goal directed therapy.

## 2021-03-26 NOTE — PROGRESS NOTE ADULT - PROBLEM SELECTOR PLAN 2
S/p previous cardio version   Cont Metoprolol , on 25 mg oral twice a day   Pt was on Metoprolol 50 AM and 25mg at night   Pend cardioversion on 3/29   on Heparin drip   monitor PTT S/p previous cardio version   Cont Metoprolol , on 25 mg oral twice a day --> will most likely increased to 50 mg  twice a day if HR Is not well controlled   Pt was on Metoprolol 50 AM and 25mg at night  at home ( his    Pharmacy confirmed the dose )   Pend cardioversion on 3/29   on Heparin drip   monitor PTT  I have called the [employer] and his HR was 100 at 7:40PM , cont current dose

## 2021-03-26 NOTE — CONSULT NOTE ADULT - ATTENDING COMMENTS
May be typical flutter by with substrate must also consider atypical circuits.  History of CV for AF.  May need L and R atrial ablation, but would defer acutely, ie in the setting of AMI. Favor ONEYDA guided DCCV and possible elective ablation.

## 2021-03-26 NOTE — PROGRESS NOTE ADULT - SUBJECTIVE AND OBJECTIVE BOX
HOLLEY TINOCO  MRN-[id number]  Patient is a 56y old  Male who presents with a chief complaint of CAD (25 Mar 2021 18:47)    HPI:  The patient is a 56 year old male, PMH HTN, HLD, DM, RHODA on CPAP, RHD s/p mitral valve replacement in 2002 and afib on coumadin, presenting to CCU following a RCA KALIN and thrombectomy. The patient was in his usual state of health until yesterday evening, when he felt very fatigued. This morning when he awoke, he was feeling significant chest pain which he describes as burning. He also felt short of breath and very nauseous, but did not vomit. He took a cab to the [facility] ED. He was found to be tachycardic to 129, EKG with ST elevations in interior leads. He was given morphine, aspirin 325, Brilinta and nitroglycerin. He was transferred to [facility], where was found to have 100% occlusion in the proximal RCA. He received one KALIN and a thrombectomy.  (25 Mar 2021 14:06)      24 HOUR EVENTS:    REVIEW OF SYSTEMS:    CONSTITUTIONAL: No weakness, fevers or chills  EYES/ENT: No visual changes;  No vertigo or throat pain   NECK: No pain or stiffness  RESPIRATORY: No cough, wheezing, hemoptysis; No shortness of breath  CARDIOVASCULAR: No chest pain or palpitations  GASTROINTESTINAL: No abdominal or epigastric pain. No nausea, vomiting, or hematemesis; No diarrhea or constipation. No melena or hematochezia.  GENITOURINARY: No dysuria, frequency or hematuria  NEUROLOGICAL: No numbness or weakness  SKIN: No itching, rashes      ICU Vital Signs Last 24 Hrs  T(C): 36.5 (25 Mar 2021 17:00), Max: 36.6 (25 Mar 2021 13:21)  T(F): 97.7 (25 Mar 2021 17:00), Max: 97.9 (25 Mar 2021 13:21)  HR: 132 (26 Mar 2021 06:00) (62 - 132)  BP: 105/80 (26 Mar 2021 06:00) (96/71 - 129/83)  BP(mean): 92 (26 Mar 2021 06:00) (78 - 108)  ABP: --  ABP(mean): --  RR: 15 (26 Mar 2021 06:00) (14 - 24)  SpO2: 98% (26 Mar 2021 06:00) (95% - 99%)      CVP(mm Hg): --  CO: --  CI: --  PA: --  PA(mean): --  PA(direct): --  PCWP: --  LA: --  RA: --  SVR: --  SVRI: --  PVR: --  PVRI: --  I&O's Summary    25 Mar 2021 07:01  -  26 Mar 2021 07:00  --------------------------------------------------------  IN: 896 mL / OUT: 2495 mL / NET: -1599 mL        CAPILLARY BLOOD GLUCOSE    CAPILLARY BLOOD GLUCOSE      POCT Blood Glucose.: 110 mg/dL (25 Mar 2021 21:29)      PHYSICAL EXAM:  GENERAL: No acute distress, well-developed  HEAD:  Atraumatic, Normocephalic  EYES: EOMI, PERRLA, conjunctiva and sclera clear  NECK: Supple, no lymphadenopathy, no JVD  CHEST/LUNG: CTAB; No wheezes, rales, or rhonchi  HEART: Regular rate and rhythm. Normal S1/S2. No murmurs, rubs, or gallops  ABDOMEN: Soft, non-tender, non-distended; normal bowel sounds, no organomegaly  EXTREMITIES:  2+ peripheral pulses b/l, No clubbing, cyanosis, or edema  NEUROLOGY: A&O x 3, no focal deficits  SKIN: No rashes or lesions    ============================I/O===========================   I&O's Detail    25 Mar 2021 07:01  -  26 Mar 2021 07:00  --------------------------------------------------------  IN:    Eptifbatide: 26 mL    Heparin Infusion: 150 mL    Oral Fluid: 720 mL  Total IN: 896 mL    OUT:    Voided (mL): 2495 mL  Total OUT: 2495 mL    Total NET: -1599 mL        ============================ LABS =========================                        12.9   7.41  )-----------( 229      ( 26 Mar 2021 00:19 )             41.0     03-26    138  |  105  |  14  ----------------------------<  122<H>  3.8   |  22  |  0.99    Ca    8.6      26 Mar 2021 00:19  Phos  2.8     03-26  Mg     2.0     03-26    TPro  6.7  /  Alb  3.9  /  TBili  0.8  /  DBili  x   /  AST  68<H>  /  ALT  24  /  AlkPhos  52  03-26    Troponin T, High Sensitivity Result: 749 ng/L (03-26-21 @ 00:19)  Troponin T, High Sensitivity Result: 166 ng/L (03-25-21 @ 14:57)    CKMB Units: 35.1 ng/mL (03-26-21 @ 00:19)  CKMB Units: 15.6 ng/mL (03-25-21 @ 14:57)    Creatine Kinase, Serum: 656 U/L (03-26-21 @ 00:19)  Creatine Kinase, Serum: 518 U/L (03-25-21 @ 14:57)    CPK Mass Assay %: 5.4 % (03-26-21 @ 00:19)  CPK Mass Assay %: 3.0 % (03-25-21 @ 14:57)        LIVER FUNCTIONS - ( 26 Mar 2021 00:19 )  Alb: 3.9 g/dL / Pro: 6.7 g/dL / ALK PHOS: 52 U/L / ALT: 24 U/L / AST: 68 U/L / GGT: x           PTT - ( 25 Mar 2021 14:57 )  PTT:>200.0 sec    Lactate, Blood: 1.4 mmol/L (03-26-21 @ 00:19)  Lactate, Blood: 1.5 mmol/L (03-25-21 @ 14:57)      ======================Micro/Rad/Cardio=================  Telemtry: Reviewed   EKG: Reviewed  CXR: Reviewed  Culture: Reviewed     ======================================================  PAST MEDICAL & SURGICAL HISTORY:  RHODA on CPAP    HLD (hyperlipidemia)    Diabetes    HTN (hypertension)    Afib    H/O mitral valve replacement       HOLLEY TINOCO  MRN-[id number]  Patient is a 56y old  Male who presents with a chief complaint of CAD (25 Mar 2021 18:47)    HPI:  The patient is a 56 year old male, PMH HTN, HLD, DM, RHODA on CPAP, RHD s/p mitral valve replacement in 2002 and afib on coumadin, presenting to CCU following a RCA KALIN and thrombectomy. The patient was in his usual state of health until yesterday evening, when he felt very fatigued. This morning when he awoke, he was feeling significant chest pain which he describes as burning. He also felt short of breath and very nauseous, but did not vomit. He took a cab to the [facility] ED. He was found to be tachycardic to 129, EKG with ST elevations in interior leads. He was given morphine, aspirin 325, Brilinta and nitroglycerin. He was transferred to [facility], where was found to have 100% occlusion in the proximal RCA. He received one KALIN and a thrombectomy.  (25 Mar 2021 14:06)      24 HOUR EVENTS:  s/p integrellin, now on heparin   -brilinta--> plavix (patient will require triple therapy given Afib)   -aflutter, received lopressor 25 and amiodarone     REVIEW OF SYSTEMS:    CONSTITUTIONAL: No weakness, fevers or chills  EYES/ENT: No visual changes;  No vertigo or throat pain   NECK: No pain or stiffness  RESPIRATORY: No cough, wheezing, hemoptysis; No shortness of breath  CARDIOVASCULAR: No chest pain or palpitations  GASTROINTESTINAL: No abdominal or epigastric pain. No nausea, vomiting, or hematemesis; No diarrhea or constipation. No melena or hematochezia.  GENITOURINARY: No dysuria, frequency or hematuria  NEUROLOGICAL: No numbness or weakness  SKIN: No itching, rashes      ICU Vital Signs Last 24 Hrs  T(C): 36.5 (25 Mar 2021 17:00), Max: 36.6 (25 Mar 2021 13:21)  T(F): 97.7 (25 Mar 2021 17:00), Max: 97.9 (25 Mar 2021 13:21)  HR: 132 (26 Mar 2021 06:00) (62 - 132)  BP: 105/80 (26 Mar 2021 06:00) (96/71 - 129/83)  BP(mean): 92 (26 Mar 2021 06:00) (78 - 108)  ABP: --  ABP(mean): --  RR: 15 (26 Mar 2021 06:00) (14 - 24)  SpO2: 98% (26 Mar 2021 06:00) (95% - 99%)        25 Mar 2021 07:01  -  26 Mar 2021 07:00  --------------------------------------------------------  IN: 896 mL / OUT: 2495 mL / NET: -1599 mL        CAPILLARY BLOOD GLUCOSE    CAPILLARY BLOOD GLUCOSE      POCT Blood Glucose.: 110 mg/dL (25 Mar 2021 21:29)      PHYSICAL EXAM:  GENERAL: No acute distress, well-developed  HEAD:  Atraumatic, Normocephalic  EYES: EOMI, PERRLA, conjunctiva and sclera clear  NECK: Supple, no lymphadenopathy, no JVD  CHEST/LUNG: CTAB; No wheezes, rales, or rhonchi  HEART: Regular rate and rhythm. Normal S1/S2. No murmurs, rubs, or gallops  ABDOMEN: Soft, non-tender, non-distended; normal bowel sounds, no organomegaly  EXTREMITIES:  2+ peripheral pulses b/l, No clubbing, cyanosis, or edema  NEUROLOGY: A&O x 3, no focal deficits  SKIN: No rashes or lesions    ============================I/O===========================   I&O's Detail    25 Mar 2021 07:01  -  26 Mar 2021 07:00  --------------------------------------------------------  IN:    Eptifbatide: 26 mL    Heparin Infusion: 150 mL    Oral Fluid: 720 mL  Total IN: 896 mL    OUT:    Voided (mL): 2495 mL  Total OUT: 2495 mL    Total NET: -1599 mL        ============================ LABS =========================                        12.9   7.41  )-----------( 229      ( 26 Mar 2021 00:19 )             41.0     03-26    138  |  105  |  14  ----------------------------<  122<H>  3.8   |  22  |  0.99    Ca    8.6      26 Mar 2021 00:19  Phos  2.8     03-26  Mg     2.0     03-26    TPro  6.7  /  Alb  3.9  /  TBili  0.8  /  DBili  x   /  AST  68<H>  /  ALT  24  /  AlkPhos  52  03-26    Troponin T, High Sensitivity Result: 749 ng/L (03-26-21 @ 00:19)  Troponin T, High Sensitivity Result: 166 ng/L (03-25-21 @ 14:57)    CKMB Units: 35.1 ng/mL (03-26-21 @ 00:19)  CKMB Units: 15.6 ng/mL (03-25-21 @ 14:57)    Creatine Kinase, Serum: 656 U/L (03-26-21 @ 00:19)  Creatine Kinase, Serum: 518 U/L (03-25-21 @ 14:57)    CPK Mass Assay %: 5.4 % (03-26-21 @ 00:19)  CPK Mass Assay %: 3.0 % (03-25-21 @ 14:57)        LIVER FUNCTIONS - ( 26 Mar 2021 00:19 )  Alb: 3.9 g/dL / Pro: 6.7 g/dL / ALK PHOS: 52 U/L / ALT: 24 U/L / AST: 68 U/L / GGT: x           PTT - ( 25 Mar 2021 14:57 )  PTT:>200.0 sec    Lactate, Blood: 1.4 mmol/L (03-26-21 @ 00:19)  Lactate, Blood: 1.5 mmol/L (03-25-21 @ 14:57)      ======================Micro/Rad/Cardio=================  Telemtry: Reviewed   EKG: Reviewed  CXR: Reviewed  Culture: Reviewed     ======================================================  PAST MEDICAL & SURGICAL HISTORY:  RHODA on CPAP    HLD (hyperlipidemia)    Diabetes    HTN (hypertension)    Afib    H/O mitral valve replacement

## 2021-03-26 NOTE — PROGRESS NOTE ADULT - SUBJECTIVE AND OBJECTIVE BOX
Patient is a 56y old  Male who presents with a chief complaint of CAD (25 Mar 2021 18:47)      SUBJECTIVE / OVERNIGHT EVENTS:  HOSPITALIST ACCEPTANCE NOTE                                                        Demetra Bridges  [phone]     HPI   :       56 year old male, with PMH HTN, HLD, DM, RHODA on CPAP, Rheumatoid Heart Disease s/p mitral valve replacement in 2002 and Afib on coumadin, admitted to CCU on 3/25  following a RCA KALIN and thrombectomy. The patient was in his usual state of health until the night prior to admission, when he felt very fatigued. This morning when he awoke, he was feeling significant chest pain which he described as burning. He also felt short of breath and very nauseous, but did not vomit.  He took a cab to the [facility] ED. He was found to be tachycardic to 129, EKG with ST elevations in interior leads. He was given morphine, aspirin 325, Brilinta and nitroglycerin. He was transferred to [facility], where was found to have 100% occlusion in the proximal RCA. He received one KALIN and a thrombectomy.  EP team has seen patient for  persistent narrow complex tachycardia. Plan  for ONEYDA and DCCV on 3/29.          ADDITIONAL REVIEW OF SYSTEMS: Negative except for above    MEDICATIONS  (STANDING):  aspirin enteric coated 81 milliGRAM(s) Oral daily  atorvastatin 80 milliGRAM(s) Oral at bedtime  chlorhexidine 2% Cloths 1 Application(s) Topical <User Schedule>  heparin  Infusion.  Unit(s)/Hr (15 mL/Hr) IV Continuous <Continuous>  insulin lispro (ADMELOG) corrective regimen sliding scale   SubCutaneous three times a day before meals  insulin lispro (ADMELOG) corrective regimen sliding scale   SubCutaneous at bedtime  metoprolol tartrate 25 milliGRAM(s) Oral two times a day    MEDICATIONS  (PRN):      CAPILLARY BLOOD GLUCOSE      POCT Blood Glucose.: 130 mg/dL (26 Mar 2021 11:37)  POCT Blood Glucose.: 136 mg/dL (26 Mar 2021 08:00)  POCT Blood Glucose.: 110 mg/dL (25 Mar 2021 21:29)  POCT Blood Glucose.: 124 mg/dL (25 Mar 2021 17:18)    I&O's Summary    25 Mar 2021 07:01  -  26 Mar 2021 07:00  --------------------------------------------------------  IN: 896 mL / OUT: 2495 mL / NET: -1599 mL    26 Mar 2021 07:01  -  26 Mar 2021 14:41  --------------------------------------------------------  IN: 564 mL / OUT: 625 mL / NET: -61 mL        PHYSICAL EXAM:  Vital Signs Last 24 Hrs  T(C): 37.1 (26 Mar 2021 13:59), Max: 37.1 (26 Mar 2021 13:59)  T(F): 98.7 (26 Mar 2021 13:59), Max: 98.7 (26 Mar 2021 13:59)  HR: 130 (26 Mar 2021 13:59) (62 - 132)  BP: 130/98 (26 Mar 2021 13:59) (96/67 - 130/98)  BP(mean): 106 (26 Mar 2021 13:00) (76 - 106)  RR: 18 (26 Mar 2021 13:59) (14 - 27)  SpO2: 99% (26 Mar 2021 13:59) (95% - 99%)    PHYSICAL EXAM:  GENERAL: NAD, well-developed  HEAD:  Atraumatic, Normocephalic  EYES:  conjunctiva and sclera clear  NECK: Supple, No JVD  CHEST/LUNG: Clear to auscultation bilaterally; No wheeze  HEART: Regular rate and rhythm; No murmurs, rubs, or gallops  ABDOMEN: Soft, Nontender, Nondistended; Bowel sounds present  EXTREMITIES:  2+ Peripheral Pulses, No clubbing, cyanosis, or edema  PSYCH: AAOx3  NEUROLOGY: non-focal  SKIN: No rashes or lesions      LABS:                        13.6   9.23  )-----------( 235      ( 26 Mar 2021 08:01 )             42.7     03-26    138  |  105  |  14  ----------------------------<  122<H>  3.8   |  22  |  0.99    Ca    8.6      26 Mar 2021 00:19  Phos  2.8     03-26  Mg     2.0     03-26    TPro  6.7  /  Alb  3.9  /  TBili  0.8  /  DBili  x   /  AST  68<H>  /  ALT  24  /  AlkPhos  52  03-26    PTT - ( 26 Mar 2021 08:01 )  PTT:>200.0 sec  CARDIAC MARKERS ( 26 Mar 2021 08:38 )  x     / x     / 579 U/L / x     / 27.9 ng/mL  CARDIAC MARKERS ( 26 Mar 2021 00:19 )  x     / x     / 656 U/L / x     / 35.1 ng/mL  CARDIAC MARKERS ( 25 Mar 2021 14:57 )  x     / x     / 518 U/L / x     / 15.6 ng/mL            RADIOLOGY & ADDITIONAL TESTS:    Imaging Personally Reviewed:    Electrocardiogram Personally Reviewed:    COORDINATION OF CARE:  Care Discussed with Consultants/Other Providers [Y/N]:  Prior or Outpatient Records Reviewed [Y/N]:     Patient is a 56y old  Male who presents with a chief complaint of CAD (25 Mar 2021 18:47)      SUBJECTIVE / OVERNIGHT EVENTS:  HOSPITALIST ACCEPTANCE NOTE                                                        Demetra Bridges  [phone]     HPI   :       56 year old male, with PMHX HTN, HLD, DM, RHODA on CPAP, Rheumatoid Heart Disease s/p mitral valve replacement in 2002 and Afib on coumadin, admitted to CCU on 3/25  following a RCA KALIN and thrombectomy. The patient was in his usual state of health until the night prior to admission, when he felt very fatigued. This morning when he awoke, he was feeling significant chest pain which he described as burning. He also felt short of breath and very nauseous, but did not vomit.  He took a cab to the [facility] ED. He was found to be tachycardic to 129, EKG with ST elevations in interior leads. He was given morphine, aspirin 325, Brilinta and nitroglycerin. He was transferred to [facility], where was found to have 100% occlusion in the proximal RCA. He received one KALIN and a thrombectomy.  EP team has seen patient for  persistent narrow complex tachycardia. Plan  for ONEYDA and DCCV on 3/29.  Patient was seen on the regular          ADDITIONAL REVIEW OF SYSTEMS: Negative except for above    MEDICATIONS  (STANDING):  aspirin enteric coated 81 milliGRAM(s) Oral daily  atorvastatin 80 milliGRAM(s) Oral at bedtime  chlorhexidine 2% Cloths 1 Application(s) Topical <User Schedule>  heparin  Infusion.  Unit(s)/Hr (15 mL/Hr) IV Continuous <Continuous>  insulin lispro (ADMELOG) corrective regimen sliding scale   SubCutaneous three times a day before meals  insulin lispro (ADMELOG) corrective regimen sliding scale   SubCutaneous at bedtime  metoprolol tartrate 25 milliGRAM(s) Oral two times a day    MEDICATIONS  (PRN):      CAPILLARY BLOOD GLUCOSE      POCT Blood Glucose.: 130 mg/dL (26 Mar 2021 11:37)  POCT Blood Glucose.: 136 mg/dL (26 Mar 2021 08:00)  POCT Blood Glucose.: 110 mg/dL (25 Mar 2021 21:29)  POCT Blood Glucose.: 124 mg/dL (25 Mar 2021 17:18)    I&O's Summary    25 Mar 2021 07:01  -  26 Mar 2021 07:00  --------------------------------------------------------  IN: 896 mL / OUT: 2495 mL / NET: -1599 mL    26 Mar 2021 07:01  -  26 Mar 2021 14:41  --------------------------------------------------------  IN: 564 mL / OUT: 625 mL / NET: -61 mL        PHYSICAL EXAM:  Vital Signs Last 24 Hrs  T(C): 37.1 (26 Mar 2021 13:59), Max: 37.1 (26 Mar 2021 13:59)  T(F): 98.7 (26 Mar 2021 13:59), Max: 98.7 (26 Mar 2021 13:59)  HR: 130 (26 Mar 2021 13:59) (62 - 132)  BP: 130/98 (26 Mar 2021 13:59) (96/67 - 130/98)  BP(mean): 106 (26 Mar 2021 13:00) (76 - 106)  RR: 18 (26 Mar 2021 13:59) (14 - 27)  SpO2: 99% (26 Mar 2021 13:59) (95% - 99%)    PHYSICAL EXAM:  GENERAL: NAD, well-developed  HEAD:  Atraumatic, Normocephalic  EYES:  conjunctiva and sclera clear  NECK: Supple, No JVD  CHEST/LUNG: Clear to auscultation bilaterally; No wheeze  HEART: Regular rate and rhythm; No murmurs, rubs, or gallops  ABDOMEN: Soft, Nontender, Nondistended; Bowel sounds present  EXTREMITIES:  2+ Peripheral Pulses, No clubbing, cyanosis, or edema  PSYCH: AAOx3  NEUROLOGY: non-focal  SKIN: No rashes or lesions      LABS:                        13.6   9.23  )-----------( 235      ( 26 Mar 2021 08:01 )             42.7     03-26    138  |  105  |  14  ----------------------------<  122<H>  3.8   |  22  |  0.99    Ca    8.6      26 Mar 2021 00:19  Phos  2.8     03-26  Mg     2.0     03-26    TPro  6.7  /  Alb  3.9  /  TBili  0.8  /  DBili  x   /  AST  68<H>  /  ALT  24  /  AlkPhos  52  03-26    PTT - ( 26 Mar 2021 08:01 )  PTT:>200.0 sec  CARDIAC MARKERS ( 26 Mar 2021 08:38 )  x     / x     / 579 U/L / x     / 27.9 ng/mL  CARDIAC MARKERS ( 26 Mar 2021 00:19 )  x     / x     / 656 U/L / x     / 35.1 ng/mL  CARDIAC MARKERS ( 25 Mar 2021 14:57 )  x     / x     / 518 U/L / x     / 15.6 ng/mL            RADIOLOGY & ADDITIONAL TESTS:    Imaging Personally Reviewed:    Electrocardiogram Personally Reviewed:    COORDINATION OF CARE:  Care Discussed with Consultants/Other Providers [Y/N]:  Prior or Outpatient Records Reviewed [Y/N]:     Patient is a 56y old  Male who presents with a chief complaint of CAD (25 Mar 2021 18:47)      SUBJECTIVE / OVERNIGHT EVENTS:  HOSPITALIST ACCEPTANCE NOTE                                                        Demetra Bridges  [phone]     HPI   :       56 year old male, with PMHX HTN, HLD, DM, RHODA on CPAP, Rheumatoid Heart Disease s/p mitral valve replacement in 2002 and Afib on coumadin, admitted to CCU on 3/25  following a RCA KALIN and thrombectomy due to STEMI. The patient was in his usual state of health until the night prior to admission, when he felt very fatigued. This morning when he awoke, he was feeling significant chest pain which he described as burning. He also felt short of breath and very nauseous, but did not vomit.  He took a cab to the [facility] ED. He was found to be tachycardic to 129, EKG with ST elevations in interior leads. He was given morphine, aspirin 325, Brilinta and nitroglycerin. He was transferred to [facility], where was found to have 100% occlusion in the proximal RCA. He received one KALIN and a thrombectomy.  EP team has seen patient for  persistent narrow complex tachycardia. Plan  for ONEYDA and DCCV on 3/29.  Patient was seen on the regular  with no complaints.  NO chest pain , no SOB, no cough and no palpitations         ADDITIONAL REVIEW OF SYSTEMS: Negative except for above    MEDICATIONS  (STANDING):  aspirin enteric coated 81 milliGRAM(s) Oral daily  atorvastatin 80 milliGRAM(s) Oral at bedtime  chlorhexidine 2% Cloths 1 Application(s) Topical <User Schedule>  heparin  Infusion.  Unit(s)/Hr (15 mL/Hr) IV Continuous <Continuous>  insulin lispro (ADMELOG) corrective regimen sliding scale   SubCutaneous three times a day before meals  insulin lispro (ADMELOG) corrective regimen sliding scale   SubCutaneous at bedtime  metoprolol tartrate 25 milliGRAM(s) Oral two times a day    MEDICATIONS  (PRN):      CAPILLARY BLOOD GLUCOSE      POCT Blood Glucose.: 130 mg/dL (26 Mar 2021 11:37)  POCT Blood Glucose.: 136 mg/dL (26 Mar 2021 08:00)  POCT Blood Glucose.: 110 mg/dL (25 Mar 2021 21:29)  POCT Blood Glucose.: 124 mg/dL (25 Mar 2021 17:18)    I&O's Summary    25 Mar 2021 07:01  -  26 Mar 2021 07:00  --------------------------------------------------------  IN: 896 mL / OUT: 2495 mL / NET: -1599 mL    26 Mar 2021 07:01  -  26 Mar 2021 14:41  --------------------------------------------------------  IN: 564 mL / OUT: 625 mL / NET: -61 mL        PHYSICAL EXAM:  Vital Signs Last 24 Hrs  T(C): 37.1 (26 Mar 2021 13:59), Max: 37.1 (26 Mar 2021 13:59)  T(F): 98.7 (26 Mar 2021 13:59), Max: 98.7 (26 Mar 2021 13:59)  HR: 130 (26 Mar 2021 13:59) (62 - 132)  BP: 130/98 (26 Mar 2021 13:59) (96/67 - 130/98)  BP(mean): 106 (26 Mar 2021 13:00) (76 - 106)  RR: 18 (26 Mar 2021 13:59) (14 - 27)  SpO2: 99% (26 Mar 2021 13:59) (95% - 99%)    PHYSICAL EXAM:  GENERAL: NAD, well-developed  HEAD:  Atraumatic, Normocephalic  EYES:  conjunctiva and sclera clear  NECK: Supple, No JVD  CHEST/LUNG: Clear to auscultation bilaterally; No wheeze  HEART: Regular rate and rhythm; No rubs, or gallops  ABDOMEN: Soft, Nontender, Nondistended; Bowel sounds present  EXTREMITIES:  2+ Peripheral Pulses, No clubbing, cyanosis, or edema  PSYCH: Affect is appropriate   NEUROLOGY: non-focal, AAOx3  SKIN: No rashes or lesions      LABS:                        13.6   9.23  )-----------( 235      ( 26 Mar 2021 08:01 )             42.7     03-26    138  |  105  |  14  ----------------------------<  122<H>  3.8   |  22  |  0.99    Ca    8.6      26 Mar 2021 00:19  Phos  2.8     03-26  Mg     2.0     03-26    TPro  6.7  /  Alb  3.9  /  TBili  0.8  /  DBili  x   /  AST  68<H>  /  ALT  24  /  AlkPhos  52  03-26    PTT - ( 26 Mar 2021 08:01 )  PTT:>200.0 sec  CARDIAC MARKERS ( 26 Mar 2021 08:38 )  x     / x     / 579 U/L / x     / 27.9 ng/mL  CARDIAC MARKERS ( 26 Mar 2021 00:19 )  x     / x     / 656 U/L / x     / 35.1 ng/mL  CARDIAC MARKERS ( 25 Mar 2021 14:57 )  x     / x     / 518 U/L / x     / 15.6 ng/mL            RADIOLOGY & ADDITIONAL TESTS:    Imaging Personally Reviewed:    Electrocardiogram Personally Reviewed:    COORDINATION OF CARE:  Care Discussed with Consultants/Other Providers [Y/N]:  Prior or Outpatient Records Reviewed [Y/N]:

## 2021-03-26 NOTE — PROGRESS NOTE ADULT - PROBLEM SELECTOR PLAN 4
Was on Metformin at home   Glucose is stable Was on Metformin at home   Glucose is stable  cont insulin scale and monitor glucose

## 2021-03-26 NOTE — PROGRESS NOTE ADULT - PROBLEM SELECTOR PLAN 1
RCA KALIN and thrombectomy  TTE is pend / was done as per tech / f/up results   cont Atorvastatin , Metoprolol   on ASA and Plavix   Pt is on ACEI / might start if ok with Cardio   Cardio follow up   Cardiologist ( Sunday Goode was called and no response ) RCA KAILN and thrombectomy  TTE is pend / was done as per tech / f/up results   cont Atorvastatin , Metoprolol , ASA and Plavix   Pt is not  on ACEI / might start if ok with Cardio  in AM   Cardio follow up ( case is discussed with the cardio fellow)   Cardiologist ( Sunday Goode was called and no response )  His PCP was notified

## 2021-03-26 NOTE — PROGRESS NOTE ADULT - ASSESSMENT
====================ASSESSMENT ==============  STEMI S/P RCA KALIN and thrombectomy on 3/25  DMT2 w/ Hyperglycemia  AFib    Plan:  ====================== NEUROLOGY=====================  A&Ox3, nonfocal  - Continue to monitor neuro status as per protocol     ==================== RESPIRATORY======================  Comfortable on room air, SpO2 99%  - Hx of RHODA on CPAP @ home x5yrs, has had machine malfunction and has not used in past 2 mths. Does not wish to use the hospital equipment overnight.    - Continue to monitor SpO2 via pulse oximetry    ====================CARDIOVASCULAR==================  IWSTEMI  - [facility] 3/25: pRCA 100% KALIN x1 + integrillin gtt   - Cont with DAPT: ASA, and reload on plavix in AM in setting of Triple Therapy (Mechanical Valve)  - High dose statin daily   - GDMT as tolerated (BB, ACEi or ARB)   - TTE, Trend CE     Mechanical Mitral Valve Replacement 2002   - On coumadin @ home  - now s/p MI, will require triple therapy -> ASA/Plavix/Coumadin upon d/c  - Maintain on heparin gtt overnight 3/25    Afib   - C/w home med Amiodarone 200mg QD  - BB as BP tolerates  - C/w A/C with Heparin    aMIOdarone    Tablet 200 milliGRAM(s) Oral daily  atorvastatin 80 milliGRAM(s) Oral at bedtime  ===================HEMATOLOGIC/ONC ===================  H/H 13.6/43.7.   - Continue to monitor hemoglobin and hematocrit levels.     VTE prophylaxis   - Continue Heparin gtt  - Monitor PTT levels    heparin  Infusion.  Unit(s)/Hr (15 mL/Hr) IV Continuous <Continuous>  ===================== RENAL =========================  No active issues  - Continue to monitor I/Os, BUN/Creatinine, and urine output.   - Goal net negative fluid balance. Replete lytes PRN. Keep K> 4 and Mg >2.     ==================== GASTROINTESTINAL===================  PO DASH/TLC diet.   Bowel regimen PRN    =======================    ENDOCRINE  =====================  Hx of DMT2  - glycemic control with Admelog sliding scale.   - Monitor blood glucose levels.   - A1c 5.9     TSH normal    insulin lispro (ADMELOG) corrective regimen sliding scale   SubCutaneous three times a day before meals  ========================INFECTIOUS DISEASE================  Afebrile, WBC within normal limits.   - Monitor temperature and trend WBC.   - Monitor off abx.      ====================ASSESSMENT ==============  STEMI S/P RCA KALIN and thrombectomy on 3/25  DMT2 w/ Hyperglycemia  AFib    Plan:  ====================== NEUROLOGY=====================  A&Ox3, nonfocal  - Continue to monitor neuro status as per protocol     ==================== RESPIRATORY======================  Comfortable on room air, SpO2 99%  - Hx of RHODA on CPAP @ home x5yrs, has had machine malfunction and has not used in past 2 mths. Does not wish to use the hospital equipment overnight.    - Continue to monitor SpO2 via pulse oximetry    ====================CARDIOVASCULAR==================  IWSTEMI  - [facility] 3/25: pRCA 100% KALIN x1 + integrillin gtt   - Cont with DAPT: ASA, and reload on plavix in AM in setting of Triple Therapy (Mechanical Valve)  - High dose statin daily   - GDMT as tolerated (BB, ACEi or ARB)   - TTE, Trend CE     Mechanical Mitral Valve Replacement 2002   - On coumadin @ home  - now s/p MI, will require triple therapy -> ASA/Plavix/Coumadin upon d/c  - Maintain on heparin gtt overnight 3/25    Afib   - C/w home med Amiodarone 200mg QD.   -Aflutter overnight--> required lopressor 25 and additional amio 150 IV this AM   - BB as BP tolerates  - C/w A/C with Heparin    aMIOdarone    Tablet 200 milliGRAM(s) Oral daily  atorvastatin 80 milliGRAM(s) Oral at bedtime  ===================HEMATOLOGIC/ONC ===================  H/H 13.6/43.7.   - Continue to monitor hemoglobin and hematocrit levels.     VTE prophylaxis   - Continue Heparin gtt  - Monitor PTT levels    heparin  Infusion.  Unit(s)/Hr (15 mL/Hr) IV Continuous <Continuous>  ===================== RENAL =========================  No active issues  - Continue to monitor I/Os, BUN/Creatinine, and urine output.   - Goal net negative fluid balance. Replete lytes PRN. Keep K> 4 and Mg >2.     ==================== GASTROINTESTINAL===================  PO DASH/TLC diet.   Bowel regimen PRN    =======================    ENDOCRINE  =====================  Hx of DMT2  - glycemic control with Admelog sliding scale.   - Monitor blood glucose levels.   - A1c 5.9     TSH normal    insulin lispro (ADMELOG) corrective regimen sliding scale   SubCutaneous three times a day before meals  ========================INFECTIOUS DISEASE================  Afebrile, WBC within normal limits.   - Monitor temperature and trend WBC.   - Monitor off abx.      ====================ASSESSMENT ==============  STEMI S/P RCA KALIN and thrombectomy on 3/25  DMT2 w/ Hyperglycemia  AFib    Plan:  ====================== NEUROLOGY=====================  A&Ox3, nonfocal  - Continue to monitor neuro status as per protocol     ==================== RESPIRATORY======================  Comfortable on room air, SpO2 99%  - Hx of RHODA on CPAP @ home x5yrs, has had machine malfunction and has not used in past 2 mths. Does not wish to use the hospital equipment overnight.    - Continue to monitor SpO2 via pulse oximetry    ====================CARDIOVASCULAR==================  IWSTEMI  - [facility] 3/25: pRCA 100% KALIN x1 + integrillin gtt   - Cont with DAPT: ASA, and reload on plavix in AM in setting of Triple Therapy (Mechanical Valve)  - High dose statin daily   - GDMT as tolerated (BB, ACEi or ARB)   - TTE, Trend CE     Mechanical Mitral Valve Replacement 2002   - On coumadin @ home  - now s/p MI, will require triple therapy -> ASA/Plavix/Coumadin upon d/c  - Maintain on heparin gtt overnight 3/25    Afib   - not on amiodarone for past two years despite listed as home med- will discontinue   -Aflutter overnight--> required lopressor 25 and additional amio 150 IV this AM   - increase metoprolol to 25 BID   - C/w A/C with Heparin    aMIOdarone    Tablet 200 milliGRAM(s) Oral daily  atorvastatin 80 milliGRAM(s) Oral at bedtime  ===================HEMATOLOGIC/ONC ===================  H/H 13.6/43.7.   - Continue to monitor hemoglobin and hematocrit levels.   -PTT >200x2, decrease heparin accordingly     VTE prophylaxis   - Continue Heparin gtt  - Monitor PTT levels    heparin  Infusion.  Unit(s)/Hr (15 mL/Hr) IV Continuous <Continuous>  ===================== RENAL =========================  No active issues  - Continue to monitor I/Os, BUN/Creatinine, and urine output.   - Goal net negative fluid balance. Replete lytes PRN. Keep K> 4 and Mg >2.     ==================== GASTROINTESTINAL===================  PO DASH/TLC diet.   Bowel regimen PRN    =======================    ENDOCRINE  =====================  Hx of DMT2  - glycemic control with Admelog sliding scale.   - Monitor blood glucose levels.   - A1c 5.9     TSH normal    insulin lispro (ADMELOG) corrective regimen sliding scale   SubCutaneous three times a day before meals  ========================INFECTIOUS DISEASE================  Afebrile, WBC within normal limits.   - Monitor temperature and trend WBC.   - Monitor off abx.

## 2021-03-26 NOTE — CHART NOTE - NSCHARTNOTEFT_GEN_A_CORE
CCU Transfer Note    Transfer from: CCU  Transfer to:  (  ) Medicine    ( x ) Telemetry    (  ) RCU    (  ) Palliative    (  ) Stroke Unit    (  ) _______________  Accepting physician: Dr. Carrillo     HPI: The patient is a 56 year old male, PMH HTN, HLD, DM, RHODA on CPAP, RHD s/p mitral valve replacement in 2002 and afib on coumadin, presenting to CCU following a RCA KALIN and thrombectomy. The patient was in his usual state of health until yesterday evening, when he felt very fatigued. This morning when he awoke, he was feeling significant chest pain which he describes as burning. He also felt short of breath and very nauseous, but did not vomit. He took a cab to the [facility] ED. He was found to be tachycardic to 129, EKG with ST elevations in interior leads. He was given morphine, aspirin 325, Brilinta and nitroglycerin. He was transferred to [facility], where was found to have 100% occlusion in the proximal RCA. He received one KALIN and a thrombectomy.     MEDICATIONS:  STANDING MEDICATIONS  aspirin enteric coated 81 milliGRAM(s) Oral daily  atorvastatin 80 milliGRAM(s) Oral at bedtime  chlorhexidine 2% Cloths 1 Application(s) Topical <User Schedule>  heparin  Infusion.  Unit(s)/Hr IV Continuous <Continuous>  insulin lispro (ADMELOG) corrective regimen sliding scale   SubCutaneous three times a day before meals  insulin lispro (ADMELOG) corrective regimen sliding scale   SubCutaneous at bedtime  metoprolol tartrate 25 milliGRAM(s) Oral two times a day    PRN MEDICATIONS      VITAL SIGNS: Last 24 Hours  T(C): 36.6 (26 Mar 2021 12:00), Max: 36.6 (25 Mar 2021 13:21)  T(F): 97.9 (26 Mar 2021 12:00), Max: 97.9 (25 Mar 2021 13:21)  HR: 126 (26 Mar 2021 12:00) (62 - 132)  BP: 96/67 (26 Mar 2021 11:00) (96/67 - 129/83)  BP(mean): 76 (26 Mar 2021 11:00) (76 - 108)  RR: 24 (26 Mar 2021 12:00) (14 - 27)  SpO2: 98% (26 Mar 2021 12:00) (95% - 99%)    LABS:                        13.6   9.23  )-----------( 235      ( 26 Mar 2021 08:01 )             42.7     03-26    138  |  105  |  14  ----------------------------<  122<H>  3.8   |  22  |  0.99    Ca    8.6      26 Mar 2021 00:19  Phos  2.8     03-26  Mg     2.0     03-26    TPro  6.7  /  Alb  3.9  /  TBili  0.8  /  DBili  x   /  AST  68<H>  /  ALT  24  /  AlkPhos  52  03-26    PTT - ( 26 Mar 2021 08:01 )  PTT:>200.0 sec        CARDIAC MARKERS ( 26 Mar 2021 08:38 )  x     / x     / 579 U/L / x     / 27.9 ng/mL  CARDIAC MARKERS ( 26 Mar 2021 00:19 )  x     / x     / 656 U/L / x     / 35.1 ng/mL  CARDIAC MARKERS ( 25 Mar 2021 14:57 )  x     / x     / 518 U/L / x     / 15.6 ng/mL        CCU COURSE:          ASSESSMENT & PLAN:         For Follow-Up: CCU Transfer Note    Transfer from: CCU  Transfer to:  (  ) Medicine    ( x ) Telemetry    (  ) RCU    (  ) Palliative    (  ) Stroke Unit    (  ) _______________  Accepting physician: Dr. Carrillo     HPI: The patient is a 56 year old male, PMH HTN, HLD, DM, RHODA on CPAP, RHD s/p mitral valve replacement in 2002 and afib on coumadin, presenting to CCU following a RCA KALIN and thrombectomy. The patient was in his usual state of health until yesterday evening, when he felt very fatigued. This morning when he awoke, he was feeling significant chest pain which he describes as burning. He also felt short of breath and very nauseous, but did not vomit. He took a cab to the [facility] ED. He was found to be tachycardic to 129, EKG with ST elevations in interior leads. He was given morphine, aspirin 325, Brilinta and nitroglycerin. He was transferred to [facility], where was found to have 100% occlusion in the proximal RCA. He received one KALIN and a thrombectomy.     MEDICATIONS:  STANDING MEDICATIONS  aspirin enteric coated 81 milliGRAM(s) Oral daily  atorvastatin 80 milliGRAM(s) Oral at bedtime  chlorhexidine 2% Cloths 1 Application(s) Topical <User Schedule>  heparin  Infusion.  Unit(s)/Hr IV Continuous <Continuous>  insulin lispro (ADMELOG) corrective regimen sliding scale   SubCutaneous three times a day before meals  insulin lispro (ADMELOG) corrective regimen sliding scale   SubCutaneous at bedtime  metoprolol tartrate 25 milliGRAM(s) Oral two times a day    PRN MEDICATIONS      VITAL SIGNS: Last 24 Hours  T(C): 36.6 (26 Mar 2021 12:00), Max: 36.6 (25 Mar 2021 13:21)  T(F): 97.9 (26 Mar 2021 12:00), Max: 97.9 (25 Mar 2021 13:21)  HR: 126 (26 Mar 2021 12:00) (62 - 132)  BP: 96/67 (26 Mar 2021 11:00) (96/67 - 129/83)  BP(mean): 76 (26 Mar 2021 11:00) (76 - 108)  RR: 24 (26 Mar 2021 12:00) (14 - 27)  SpO2: 98% (26 Mar 2021 12:00) (95% - 99%)    LABS:                        13.6   9.23  )-----------( 235      ( 26 Mar 2021 08:01 )             42.7     03-26    138  |  105  |  14  ----------------------------<  122<H>  3.8   |  22  |  0.99    Ca    8.6      26 Mar 2021 00:19  Phos  2.8     03-26  Mg     2.0     03-26    TPro  6.7  /  Alb  3.9  /  TBili  0.8  /  DBili  x   /  AST  68<H>  /  ALT  24  /  AlkPhos  52  03-26    PTT - ( 26 Mar 2021 08:01 )  PTT:>200.0 sec        CARDIAC MARKERS ( 26 Mar 2021 08:38 )  x     / x     / 579 U/L / x     / 27.9 ng/mL  CARDIAC MARKERS ( 26 Mar 2021 00:19 )  x     / x     / 656 U/L / x     / 35.1 ng/mL  CARDIAC MARKERS ( 25 Mar 2021 14:57 )  x     / x     / 518 U/L / x     / 15.6 ng/mL        CCU COURSE:  [facility] He was initially on integrellin, which was switched to a heparin drip. He was also initially Brilinta loaded, which was then switched to plavix, as well as aspirin and statin. He was treated with metoprolol. He experienced aflutter overnight, and received amiodarone.       ASSESSMENT & PLAN:     ====================ASSESSMENT ==============  STEMI S/P RCA KALIN and thrombectomy on 3/25  DMT2 w/ Hyperglycemia  AFib    Plan:  ====================== NEUROLOGY=====================  A&Ox3, nonfocal  - Continue to monitor neuro status as per protocol     ==================== RESPIRATORY======================  Comfortable on room air, SpO2 99%  - Hx of RHODA on CPAP @ home x5yrs, has had machine malfunction and has not used in past 2 mths. Does not wish to use the hospital equipment overnight.    - Continue to monitor SpO2 via pulse oximetry    ====================CARDIOVASCULAR==================  IWSTEMI  - [facility] 3/25: pRCA 100% KALIN x1 + integrillin gtt   - Cont with DAPT: ASA, and reload on plavix in AM in setting of Triple Therapy (Mechanical Valve)  - High dose statin daily   - GDMT as tolerated (BB, ACEi or ARB)   - TTE, Trend CE     Mechanical Mitral Valve Replacement 2002   - On coumadin @ home  - now s/p MI, will require triple therapy -> ASA/Plavix/Coumadin upon d/c  - Maintain on heparin gtt overnight 3/25    Afib   - not on amiodarone for past two years despite listed as home med- will discontinue   -Aflutter overnight--> required lopressor 25 and additional amio 150 IV this AM   - increase metoprolol to 25 BID   - C/w A/C with Heparin  -plan for ONEYDA and DCCV on Monday (defer ablation to outpatient given recent MI)     aMIOdarone    Tablet 200 milliGRAM(s) Oral daily  atorvastatin 80 milliGRAM(s) Oral at bedtime  ===================HEMATOLOGIC/ONC ===================  H/H 13.6/43.7.   - Continue to monitor hemoglobin and hematocrit levels.   -PTT >200x2, decrease heparin accordingly     VTE prophylaxis   - Continue Heparin gtt  - Monitor PTT levels    heparin  Infusion.  Unit(s)/Hr (15 mL/Hr) IV Continuous <Continuous>  ===================== RENAL =========================  No active issues  - Continue to monitor I/Os, BUN/Creatinine, and urine output.   - Goal net negative fluid balance. Replete lytes PRN. Keep K> 4 and Mg >2.     ==================== GASTROINTESTINAL===================  PO DASH/TLC diet.   Bowel regimen PRN    =======================    ENDOCRINE  =====================  Hx of DMT2  - glycemic control with Admelog sliding scale.   - Monitor blood glucose levels.   - A1c 5.9     TSH normal    insulin lispro (ADMELOG) corrective regimen sliding scale   SubCutaneous three times a day before meals  ========================INFECTIOUS DISEASE================  Afebrile, WBC within normal limits.   - Monitor temperature and trend WBC.   - Monitor off abx.         For Follow-Up:  [] ONEYDA  [] NPO @ midnight Sunday for ONEYDA and DCCV Monday  [] continue goal directed therapy

## 2021-03-26 NOTE — PROVIDER CONTACT NOTE (CRITICAL VALUE NOTIFICATION) - BACKGROUND
Pt s/p cath; hx of afib on heparin gtt for full anticoagulation. Heparin gtt started on 2/25 @2200 as per EMAR.

## 2021-03-26 NOTE — CONSULT NOTE ADULT - SUBJECTIVE AND OBJECTIVE BOX
Patient seen and evaluated at bedside    Chief Complaint: Chest Pain     HPI:  56 year old male, PMH HTN, HLD, DM, RHODA on CPAP, RHD s/p mitral valve replacement in  and afib on coumadin s/p multiple cardioversions presenting with chest pain, found to have inferior STEMI s/p RCA KALIN and thrombectomy. On arrival to ED patient was found to be tachycardic to 129, EKG with ST elevations in interior leads. He was given morphine, aspirin 325, Brilinta and nitroglycerin - transferred to [facility], EP consulted for persistent narrow complex tachycardia. Patient denies symptomatic tachycardia - no palpitations, dizziness, syncope. Has had afib for many years, denies history of flutter, previously on amiodarone but has not taken in years - has been cardioverted multiple times. Currently asymptomatic, heart rate remains in mid 120s on my assessment.     PMHx:   RHODA on CPAP  HLD (hyperlipidemia)  Diabetes  HTN (hypertension)  Mild HTN  Afib    PSHx:   H/O mitral valve replacement    Allergies:  No Known Allergies    Current Medications:   aspirin enteric coated 81 milliGRAM(s) Oral daily  atorvastatin 80 milliGRAM(s) Oral at bedtime  chlorhexidine 2% Cloths 1 Application(s) Topical <User Schedule>  heparin  Infusion.  Unit(s)/Hr IV Continuous <Continuous>  insulin lispro (ADMELOG) corrective regimen sliding scale   SubCutaneous three times a day before meals  insulin lispro (ADMELOG) corrective regimen sliding scale   SubCutaneous at bedtime  metoprolol tartrate 25 milliGRAM(s) Oral two times a day    CONSTITUTIONAL: No weakness, fevers or chills  EYES/ENT: No visual changes;  No dysphagia  NECK: No pain or stiffness  RESPIRATORY: No cough, wheezing, hemoptysis; No shortness of breath  CARDIOVASCULAR: No palpitations; No lower extremity edema  GASTROINTESTINAL: No abdominal or epigastric pain; No diarrhea or constipation. No melena or hematochezia.  BACK: No back pain  GENITOURINARY: No dysuria, frequency or hematuria  NEUROLOGICAL: No numbness or weakness  SKIN: No itching, burning, rashes, or lesions   All other review of systems is negative unless indicated above.    Physical Exam:  T(F): 97.9 (-), Max: 97.9 (-)  HR: 126 (-) (62 - 132)  BP: 96/67 (-) (96/67 - 129/83)  RR: 24 (-)  SpO2: 98% ()  GENERAL: No acute distress, well-developed  CHEST/LUNG: Clear to auscultation bilaterally; No wheeze, equal breath sounds bilaterally   HEART: Regular tach, mechanical valve sounds, no mrg   ABDOMEN: Soft, Nontender, Nondistended; Bowel sounds present  EXTREMITIES:  No clubbing, cyanosis, or edema  PSYCH: Nl behavior, nl affect  NEUROLOGY: AAOx3, non-focal, cranial nerves intact    Cardiovascular Diagnostic Testing:    ECG: narrow comlex tach at 129, inferior CAROLE, likely typical flutter    Imaging:    CXR: Personally reviewed    Labs: Personally reviewed                        13.6   9.23  )-----------( 235      ( 26 Mar 2021 08:01 )             42.7         138  |  105  |  14  ----------------------------<  122<H>  3.8   |  22  |  0.99    Ca    8.6      26 Mar 2021 00:19  Phos  2.8       Mg     2.0         TPro  6.7  /  Alb  3.9  /  TBili  0.8  /  DBili  x   /  AST  68<H>  /  ALT  24  /  AlkPhos  52  -    PTT - ( 26 Mar 2021 08:01 )  PTT:>200.0 sec    CARDIAC MARKERS ( 26 Mar 2021 08:38 )  573 ng/L / x     / x     / 579 U/L / x     / 27.9 ng/mL  CARDIAC MARKERS ( 26 Mar 2021 00:19 )  749 ng/L / x     / x     / 656 U/L / x     / 35.1 ng/mL  CARDIAC MARKERS ( 25 Mar 2021 14:57 )  166 ng/L / x     / x     / 518 U/L / x     / 15.6 ng/mL    Total Cholesterol: 143  LDL: --  HDL: 48  T    Thyroid Stimulating Hormone, Serum: 1.20 uIU/mL ( @ 18:20)    56 year old male, PMH HTN, HLD, DM, RHODA on CPAP, RHD s/p mitral valve replacement in  and afib on coumadin s/p multiple cardioversions presenting with chest pain, found to have inferior STEMI s/p RCA KALIN and thrombectomy, EP consulted for persistent narrow complex tach in 120s, likely typical flutter although at risk for atypical circuits given cardiac surgery. Currently asymptomatic.    - continue to monitor on telemetry   - hold amiodarone   - beta blockade as tolerated   - continue anticoagulation as written   - will plan for ONEYDA / DCCV on monday, NPO at midnight   - reasonable to pursue ablation however will defer in the setting of acute MI, can follow up with EP as outpatient for further discussion

## 2021-03-26 NOTE — PROGRESS NOTE ADULT - ASSESSMENT
56 year old male, with PMH HTN, HLD, DM, RHODA on CPAP, Rheumatoid Heart Disease s/p mitral valve replacement in 2002 and Afib on coumadin, admitted to CCU on 3/25  following a RCA KALIN and thrombectomy. The patient was in his usual state of health until the night prior to admission, when he felt very fatigued. This morning when he awoke, he was feeling significant chest pain which he described as burning. He also felt short of breath and very nauseous, but did not vomit.  He took a cab to the [facility] ED. He was found to be tachycardic to 129, EKG with ST elevations in interior leads. He was given morphine, aspirin 325, Brilinta and nitroglycerin. He was transferred to [facility], where was found to have 100% occlusion in the proximal RCA. He received one KALIN and a thrombectomy.  EP team has seen patient for  persistent narrow complex tachycardia. Plan  for ONEYDA and DCCV on 3/29.

## 2021-03-26 NOTE — PROVIDER CONTACT NOTE (CRITICAL VALUE NOTIFICATION) - ASSESSMENT
No s/s of bleeding.
V/S stable. R. groin site s/p cath is WNL with no hematoma noted. Pt denies any symptoms

## 2021-03-27 LAB
APTT BLD: 89.2 SEC — HIGH (ref 27.5–35.5)
APTT BLD: 94.1 SEC — HIGH (ref 27.5–35.5)
GLUCOSE BLDC GLUCOMTR-MCNC: 109 MG/DL — HIGH (ref 70–99)
GLUCOSE BLDC GLUCOMTR-MCNC: 115 MG/DL — HIGH (ref 70–99)
GLUCOSE BLDC GLUCOMTR-MCNC: 139 MG/DL — HIGH (ref 70–99)
GLUCOSE BLDC GLUCOMTR-MCNC: 91 MG/DL — SIGNIFICANT CHANGE UP (ref 70–99)

## 2021-03-27 PROCEDURE — 99233 SBSQ HOSP IP/OBS HIGH 50: CPT

## 2021-03-27 PROCEDURE — 93010 ELECTROCARDIOGRAM REPORT: CPT

## 2021-03-27 RX ORDER — METOPROLOL TARTRATE 50 MG
50 TABLET ORAL
Refills: 0 | Status: DISCONTINUED | OUTPATIENT
Start: 2021-03-27 | End: 2021-03-30

## 2021-03-27 RX ORDER — METOPROLOL TARTRATE 50 MG
25 TABLET ORAL ONCE
Refills: 0 | Status: COMPLETED | OUTPATIENT
Start: 2021-03-27 | End: 2021-03-27

## 2021-03-27 RX ADMIN — Medication 50 MILLIGRAM(S): at 17:36

## 2021-03-27 RX ADMIN — PANTOPRAZOLE SODIUM 40 MILLIGRAM(S): 20 TABLET, DELAYED RELEASE ORAL at 05:23

## 2021-03-27 RX ADMIN — CLOPIDOGREL BISULFATE 75 MILLIGRAM(S): 75 TABLET, FILM COATED ORAL at 11:41

## 2021-03-27 RX ADMIN — Medication 25 MILLIGRAM(S): at 05:23

## 2021-03-27 RX ADMIN — Medication 25 MILLIGRAM(S): at 11:40

## 2021-03-27 RX ADMIN — HEPARIN SODIUM 1000 UNIT(S)/HR: 5000 INJECTION INTRAVENOUS; SUBCUTANEOUS at 02:20

## 2021-03-27 RX ADMIN — ATORVASTATIN CALCIUM 80 MILLIGRAM(S): 80 TABLET, FILM COATED ORAL at 21:07

## 2021-03-27 RX ADMIN — Medication 81 MILLIGRAM(S): at 11:41

## 2021-03-27 RX ADMIN — HEPARIN SODIUM 1000 UNIT(S)/HR: 5000 INJECTION INTRAVENOUS; SUBCUTANEOUS at 11:42

## 2021-03-27 NOTE — PROGRESS NOTE ADULT - ASSESSMENT
56 year old male, with PMH HTN, HLD, DM, RHODA on CPAP, Rheumatoid Heart Disease s/p mitral valve replacement in 2002 and Afib on coumadin, admitted to CCU on 3/25  following a RCA KALIN and thrombectomy. The patient was in his usual state of health until the night prior to admission, when he felt very fatigued. This morning when he awoke, he was feeling significant chest pain which he described as burning. He also felt short of breath and very nauseous, but did not vomit.  He took a cab to the [facility] ED. He was found to be tachycardic to 129, EKG with ST elevations in interior leads. He was given morphine, aspirin 325, Brilinta and nitroglycerin. He was transferred to [facility], where was found to have 100% occlusion in the proximal RCA. He received one KALIN and a thrombectomy.  EP team has seen patient for  persistent narrow complex tachycardia. Plan  for ONEYDA and DCCV on 3/29.

## 2021-03-27 NOTE — PROGRESS NOTE ADULT - PROBLEM SELECTOR PLAN 2
S/p previous cardio versions   Cont Metoprolol , will increase to 50 mg  twice a day since HR is not yet well controlled   Pt was on Metoprolol 50 AM and 25mg at night  at home ( his    Pharmacy confirmed the dose )   Pend cardioversion on 3/29   on Heparin drip   monitor PTT  -Metoprolol 5mg IV push PRN for HR > 120-130's.

## 2021-03-27 NOTE — PROGRESS NOTE ADULT - PROBLEM SELECTOR PLAN 1
RCA KALIN and thrombectomy  TTE shows severe global LV systolic dysfunction. EF 25-30%.  cont Atorvastatin , Metoprolol , ASA and Plavix   Pt is not  on ACEI / might start if ok with Cardio    Cardio follow up   Cardiologist ( Dr Valerio [facility] )

## 2021-03-27 NOTE — PROGRESS NOTE ADULT - SUBJECTIVE AND OBJECTIVE BOX
Vick March MD  Division of [facility]  Cell: [phone]  Pager: [phone]  Office: [phone] Vick March MD  Division of Hospital Medicine  Cell: [phone]  Pager: [phone]  Office: [phone]    Patient is a 56y old  Male who presents with a chief complaint of AMI (26 Mar 2021 14:40)        SUBJECTIVE / OVERNIGHT EVENTS: Patient denies any CP or SOB.       MEDICATIONS  (STANDING):  aspirin enteric coated 81 milliGRAM(s) Oral daily  atorvastatin 80 milliGRAM(s) Oral at bedtime  chlorhexidine 2% Cloths 1 Application(s) Topical <User Schedule>  clopidogrel Tablet 75 milliGRAM(s) Oral daily  heparin  Infusion.  Unit(s)/Hr (15 mL/Hr) IV Continuous <Continuous>  insulin lispro (ADMELOG) corrective regimen sliding scale   SubCutaneous three times a day before meals  insulin lispro (ADMELOG) corrective regimen sliding scale   SubCutaneous at bedtime  metoprolol tartrate 50 milliGRAM(s) Oral two times a day  pantoprazole    Tablet 40 milliGRAM(s) Oral before breakfast    MEDICATIONS  (PRN):  metoprolol tartrate Injectable 5 milliGRAM(s) IV Push every 8 hours PRN HR>130      Vital Signs Last 24 Hrs  T(C): 36.8 (28 Mar 2021 04:24), Max: 36.8 (27 Mar 2021 10:57)  T(F): 98.2 (28 Mar 2021 04:24), Max: 98.2 (27 Mar 2021 10:57)  HR: 88 (28 Mar 2021 04:24) (88 - 130)  BP: 114/80 (28 Mar 2021 04:24) (113/87 - 124/82)  BP(mean): --  RR: 18 (28 Mar 2021 04:24) (18 - 18)  SpO2: 100% (28 Mar 2021 04:24) (99% - 100%)  CAPILLARY BLOOD GLUCOSE      POCT Blood Glucose.: 109 mg/dL (27 Mar 2021 21:57)  POCT Blood Glucose.: 91 mg/dL (27 Mar 2021 17:22)  POCT Blood Glucose.: 139 mg/dL (27 Mar 2021 13:08)  POCT Blood Glucose.: 115 mg/dL (27 Mar 2021 08:55)    I&O's Summary    26 Mar 2021 07:01  -  27 Mar 2021 07:00  --------------------------------------------------------  IN: 956 mL / OUT: 625 mL / NET: 331 mL    27 Mar 2021 07:01  -  28 Mar 2021 06:27  --------------------------------------------------------  IN: 1100 mL / OUT: 0 mL / NET: 1100 mL          PHYSICAL EXAM:   GENERAL: NAD, well-developed  HEAD:  Atraumatic, Normocephalic  EYES: conjunctiva and sclera clear  NECK: Supple,    CHEST/LUNG: Clear to auscultation bilaterally; No wheeze  HEART: S1S2 normal. Irregular rate and rhythm; No murmurs, rubs, or gallops  ABDOMEN: Soft, Nontender, Nondistended; Bowel sounds present  EXTREMITIES:   No clubbing, cyanosis, or edema  PSYCH/Neuro: AAOx3. Non-focal.   SKIN: No rashes or lesions      LABS:                        13.6   9.23  )-----------( 235      ( 26 Mar 2021 08:01 )             42.7           PTT - ( 27 Mar 2021 10:18 )  PTT:89.2 sec  CARDIAC MARKERS ( 26 Mar 2021 08:38 )  x     / x     / 579 U/L / x     / 27.9 ng/mL        Telemetry reviewed: Aflutter 120-130's.    RADIOLOGY & ADDITIONAL TESTS:    Imaging Personally Reviewed:  Consultant(s) Notes Reviewed:    Care Discussed with Consultants/Other Providers:

## 2021-03-28 LAB
ANION GAP SERPL CALC-SCNC: 12 MMOL/L — SIGNIFICANT CHANGE UP (ref 5–17)
APTT BLD: 75 SEC — HIGH (ref 27.5–35.5)
BUN SERPL-MCNC: 17 MG/DL — SIGNIFICANT CHANGE UP (ref 7–23)
CALCIUM SERPL-MCNC: 8.6 MG/DL — SIGNIFICANT CHANGE UP (ref 8.4–10.5)
CHLORIDE SERPL-SCNC: 104 MMOL/L — SIGNIFICANT CHANGE UP (ref 96–108)
CO2 SERPL-SCNC: 22 MMOL/L — SIGNIFICANT CHANGE UP (ref 22–31)
CREAT SERPL-MCNC: 0.95 MG/DL — SIGNIFICANT CHANGE UP (ref 0.5–1.3)
GLUCOSE BLDC GLUCOMTR-MCNC: 102 MG/DL — HIGH (ref 70–99)
GLUCOSE BLDC GLUCOMTR-MCNC: 128 MG/DL — HIGH (ref 70–99)
GLUCOSE BLDC GLUCOMTR-MCNC: 132 MG/DL — HIGH (ref 70–99)
GLUCOSE BLDC GLUCOMTR-MCNC: 135 MG/DL — HIGH (ref 70–99)
GLUCOSE SERPL-MCNC: 100 MG/DL — HIGH (ref 70–99)
HCT VFR BLD CALC: 42 % — SIGNIFICANT CHANGE UP (ref 39–50)
HGB BLD-MCNC: 13.1 G/DL — SIGNIFICANT CHANGE UP (ref 13–17)
MCHC RBC-ENTMCNC: 25.6 PG — LOW (ref 27–34)
MCHC RBC-ENTMCNC: 31.2 GM/DL — LOW (ref 32–36)
MCV RBC AUTO: 82.2 FL — SIGNIFICANT CHANGE UP (ref 80–100)
NRBC # BLD: 0 /100 WBCS — SIGNIFICANT CHANGE UP (ref 0–0)
PLATELET # BLD AUTO: 217 K/UL — SIGNIFICANT CHANGE UP (ref 150–400)
POTASSIUM SERPL-MCNC: 4 MMOL/L — SIGNIFICANT CHANGE UP (ref 3.5–5.3)
POTASSIUM SERPL-SCNC: 4 MMOL/L — SIGNIFICANT CHANGE UP (ref 3.5–5.3)
RBC # BLD: 5.11 M/UL — SIGNIFICANT CHANGE UP (ref 4.2–5.8)
RBC # FLD: 14.3 % — SIGNIFICANT CHANGE UP (ref 10.3–14.5)
SODIUM SERPL-SCNC: 138 MMOL/L — SIGNIFICANT CHANGE UP (ref 135–145)
WBC # BLD: 7.1 K/UL — SIGNIFICANT CHANGE UP (ref 3.8–10.5)
WBC # FLD AUTO: 7.1 K/UL — SIGNIFICANT CHANGE UP (ref 3.8–10.5)

## 2021-03-28 PROCEDURE — 99232 SBSQ HOSP IP/OBS MODERATE 35: CPT

## 2021-03-28 PROCEDURE — 99233 SBSQ HOSP IP/OBS HIGH 50: CPT

## 2021-03-28 RX ORDER — METOPROLOL TARTRATE 50 MG
25 TABLET ORAL ONCE
Refills: 0 | Status: COMPLETED | OUTPATIENT
Start: 2021-03-28 | End: 2021-03-28

## 2021-03-28 RX ADMIN — Medication 25 MILLIGRAM(S): at 14:10

## 2021-03-28 RX ADMIN — Medication 81 MILLIGRAM(S): at 11:39

## 2021-03-28 RX ADMIN — PANTOPRAZOLE SODIUM 40 MILLIGRAM(S): 20 TABLET, DELAYED RELEASE ORAL at 05:28

## 2021-03-28 RX ADMIN — Medication 50 MILLIGRAM(S): at 05:30

## 2021-03-28 RX ADMIN — CLOPIDOGREL BISULFATE 75 MILLIGRAM(S): 75 TABLET, FILM COATED ORAL at 11:39

## 2021-03-28 RX ADMIN — HEPARIN SODIUM 1000 UNIT(S)/HR: 5000 INJECTION INTRAVENOUS; SUBCUTANEOUS at 08:00

## 2021-03-28 RX ADMIN — CHLORHEXIDINE GLUCONATE 1 APPLICATION(S): 213 SOLUTION TOPICAL at 05:30

## 2021-03-28 RX ADMIN — ATORVASTATIN CALCIUM 80 MILLIGRAM(S): 80 TABLET, FILM COATED ORAL at 21:04

## 2021-03-28 RX ADMIN — Medication 50 MILLIGRAM(S): at 17:28

## 2021-03-28 NOTE — PROGRESS NOTE ADULT - SUBJECTIVE AND OBJECTIVE BOX
Patient is a 56y old  Male who presents with a chief complaint of STEMI and Afib (27 Mar 2021 15:05)        SUBJECTIVE / OVERNIGHT EVENTS: Patient denies any cp or sob or symptoms.       MEDICATIONS  (STANDING):  aspirin enteric coated 81 milliGRAM(s) Oral daily  atorvastatin 80 milliGRAM(s) Oral at bedtime  chlorhexidine 2% Cloths 1 Application(s) Topical <User Schedule>  clopidogrel Tablet 75 milliGRAM(s) Oral daily  heparin  Infusion.  Unit(s)/Hr (15 mL/Hr) IV Continuous <Continuous>  insulin lispro (ADMELOG) corrective regimen sliding scale   SubCutaneous three times a day before meals  insulin lispro (ADMELOG) corrective regimen sliding scale   SubCutaneous at bedtime  metoprolol tartrate 50 milliGRAM(s) Oral two times a day  pantoprazole    Tablet 40 milliGRAM(s) Oral before breakfast    MEDICATIONS  (PRN):  metoprolol tartrate Injectable 5 milliGRAM(s) IV Push every 8 hours PRN HR>130      Vital Signs Last 24 Hrs  T(C): 36.3 (29 Mar 2021 05:01), Max: 36.7 (28 Mar 2021 11:53)  T(F): 97.3 (29 Mar 2021 05:01), Max: 98.1 (28 Mar 2021 11:53)  HR: 96 (29 Mar 2021 05:01) (63 - 108)  BP: 110/66 (29 Mar 2021 05:01) (101/71 - 128/82)  BP(mean): --  RR: 18 (29 Mar 2021 05:01) (17 - 18)  SpO2: 96% (29 Mar 2021 05:01) (96% - 100%)  CAPILLARY BLOOD GLUCOSE      POCT Blood Glucose.: 128 mg/dL (28 Mar 2021 22:04)  POCT Blood Glucose.: 135 mg/dL (28 Mar 2021 17:25)  POCT Blood Glucose.: 132 mg/dL (28 Mar 2021 12:57)  POCT Blood Glucose.: 102 mg/dL (28 Mar 2021 08:41)    I&O's Summary    27 Mar 2021 07:01  -  28 Mar 2021 07:00  --------------------------------------------------------  IN: 1220 mL / OUT: 0 mL / NET: 1220 mL    28 Mar 2021 07:01  -  29 Mar 2021 06:57  --------------------------------------------------------  IN: 960 mL / OUT: 0 mL / NET: 960 mL          PHYSICAL EXAM:   GENERAL: NAD, well-developed  HEAD:  Atraumatic, Normocephalic  EYES: conjunctiva and sclera clear  NECK: Supple   CHEST/LUNG: Clear to auscultation bilaterally; No wheeze  HEART: S1S2 normal. irregular rate and rhythm; No murmurs, rubs, or gallops  ABDOMEN: Soft, Nontender, Nondistended; Bowel sounds present  EXTREMITIES:  No clubbing, cyanosis, or edema  PSYCH/Neuro: AAOx3. Non-focal.   SKIN: No rashes or lesions      LABS:                        13.1   7.10  )-----------( 217      ( 28 Mar 2021 06:47 )             42.0     03-28    138  |  104  |  17  ----------------------------<  100<H>  4.0   |  22  |  0.95    Ca    8.6      28 Mar 2021 06:47      PTT - ( 28 Mar 2021 06:47 )  PTT:75.0 sec        Telemetry reviewed: Afib 's.     RADIOLOGY & ADDITIONAL TESTS:    Imaging Personally Reviewed:  Consultant(s) Notes Reviewed:  cardio  Care Discussed with Consultants/Other Providers:

## 2021-03-28 NOTE — PROGRESS NOTE ADULT - ASSESSMENT
56 M w/ HTN, HLD, DM, RHODA on CPAP, RHD s/p mitral valve replacement in 2002 and afib on coumadin a/w STEMI s/p KALIN to RCA pending cardioversion for aflutter.     #CAD  -c/w asa, plavix, lipitor   -Tolerating metoprolol 50mg BID, increase to 75mg BID for better rate control. Can consider ACEi tomorrow if BP remains stable    #Aflutter  -plan for ONEYDA/DCCV tomorrow  -Increase as above  -c/w hep gtt     Ziggy Smith PGY5  [phone]  All Cardiology service information can be found 24/7 on [email or web address], password: [email or web address] 56 M w/ HTN, HLD, DM, RHODA on CPAP, RHD s/p mitral valve replacement in 2002 and afib on coumadin a/w STEMI s/p KALIN to RCA pending cardioversion for aflutter.     #CAD  -c/w asa, plavix, lipitor   -Tolerating metoprolol 50mg BID, increase to 75mg BID for better rate control. Can consider ACEi tomorrow if BP remains stable    #Aflutter  -plan for ONEYDA/DCCV tomorrow, keep NPO after MN  -Increase as above  -c/w hep gtt     Ziggy Luis PGY5  [phone]  All Cardiology service information can be found 24/7 on [email or web address], password: [email or web address]

## 2021-03-28 NOTE — PROGRESS NOTE ADULT - PROBLEM SELECTOR PLAN 1
RCA KALIN and thrombectomy  TTE shows severe global LV systolic dysfunction. EF 25-30%.  cont Atorvastatin , Metoprolol , ASA and Plavix   Pt is not  on ACEI / might start if ok with Cardio    Cardio follow up   Cardiologist ( Dr Valerio [facility] )

## 2021-03-28 NOTE — PROGRESS NOTE ADULT - ASSESSMENT
56 year old male, with PMH HTN, HLD, DM, RHODA on CPAP, Rheumatoid Heart Disease s/p mitral valve replacement in 2002 and Afib on coumadin, admitted to CCU on 3/25  following a RCA KALIN and thrombectomy. The patient was in his usual state of health until the night prior to admission, when he felt very fatigued. This morning when he awoke, he was feeling significant chest pain which he described as burning. He also felt short of breath and very nauseous, but did not vomit.  He took a cab to the [facility] ED. He was found to be tachycardic to 129, EKG with ST elevations in interior leads. He was given morphine, aspirin 325, Brilinta and nitroglycerin. He was transferred to [facility], where was found to have 100% occlusion in the proximal RCA. He received one KALIN and a thrombectomy.  EP team has seen patient for  persistent narrow complex tachycardia. Plan  for ONEYDA and DCCV on 3/29.

## 2021-03-28 NOTE — PROGRESS NOTE ADULT - PROBLEM SELECTOR PLAN 2
S/p previous cardio versions   Cont Metoprolol , had increased to 50 mg  twice a day since HR is not yet well controlled. -Gave additional 25mg PO x 1 midday today. -Consider going to 75mg BID, per cards recs.    Pt was on Metoprolol 50 AM and 25mg at night  at home ( his    Pharmacy confirmed the dose )   Pend cardioversion on 3/29; NPO at [facility].   on Heparin drip   monitor PTT  -Metoprolol 5mg IV push PRN for HR > 120-130's.

## 2021-03-29 LAB
ANION GAP SERPL CALC-SCNC: 14 MMOL/L — SIGNIFICANT CHANGE UP (ref 5–17)
APTT BLD: 72.5 SEC — HIGH (ref 27.5–35.5)
BUN SERPL-MCNC: 19 MG/DL — SIGNIFICANT CHANGE UP (ref 7–23)
CALCIUM SERPL-MCNC: 9.2 MG/DL — SIGNIFICANT CHANGE UP (ref 8.4–10.5)
CHLORIDE SERPL-SCNC: 101 MMOL/L — SIGNIFICANT CHANGE UP (ref 96–108)
CO2 SERPL-SCNC: 21 MMOL/L — LOW (ref 22–31)
CREAT SERPL-MCNC: 0.94 MG/DL — SIGNIFICANT CHANGE UP (ref 0.5–1.3)
GLUCOSE BLDC GLUCOMTR-MCNC: 104 MG/DL — HIGH (ref 70–99)
GLUCOSE BLDC GLUCOMTR-MCNC: 136 MG/DL — HIGH (ref 70–99)
GLUCOSE BLDC GLUCOMTR-MCNC: 164 MG/DL — HIGH (ref 70–99)
GLUCOSE BLDC GLUCOMTR-MCNC: 95 MG/DL — SIGNIFICANT CHANGE UP (ref 70–99)
GLUCOSE SERPL-MCNC: 110 MG/DL — HIGH (ref 70–99)
HCT VFR BLD CALC: 43 % — SIGNIFICANT CHANGE UP (ref 39–50)
HGB BLD-MCNC: 13.6 G/DL — SIGNIFICANT CHANGE UP (ref 13–17)
MCHC RBC-ENTMCNC: 25.8 PG — LOW (ref 27–34)
MCHC RBC-ENTMCNC: 31.6 GM/DL — LOW (ref 32–36)
MCV RBC AUTO: 81.4 FL — SIGNIFICANT CHANGE UP (ref 80–100)
NRBC # BLD: 0 /100 WBCS — SIGNIFICANT CHANGE UP (ref 0–0)
PLATELET # BLD AUTO: 220 K/UL — SIGNIFICANT CHANGE UP (ref 150–400)
POTASSIUM SERPL-MCNC: 4.2 MMOL/L — SIGNIFICANT CHANGE UP (ref 3.5–5.3)
POTASSIUM SERPL-SCNC: 4.2 MMOL/L — SIGNIFICANT CHANGE UP (ref 3.5–5.3)
RBC # BLD: 5.28 M/UL — SIGNIFICANT CHANGE UP (ref 4.2–5.8)
RBC # FLD: 14.1 % — SIGNIFICANT CHANGE UP (ref 10.3–14.5)
SODIUM SERPL-SCNC: 136 MMOL/L — SIGNIFICANT CHANGE UP (ref 135–145)
WBC # BLD: 5.41 K/UL — SIGNIFICANT CHANGE UP (ref 3.8–10.5)
WBC # FLD AUTO: 5.41 K/UL — SIGNIFICANT CHANGE UP (ref 3.8–10.5)

## 2021-03-29 PROCEDURE — 93010 ELECTROCARDIOGRAM REPORT: CPT

## 2021-03-29 PROCEDURE — 93320 DOPPLER ECHO COMPLETE: CPT | Mod: 26

## 2021-03-29 PROCEDURE — 92960 CARDIOVERSION ELECTRIC EXT: CPT

## 2021-03-29 PROCEDURE — 99232 SBSQ HOSP IP/OBS MODERATE 35: CPT

## 2021-03-29 PROCEDURE — 93325 DOPPLER ECHO COLOR FLOW MAPG: CPT | Mod: 26

## 2021-03-29 PROCEDURE — 93312 ECHO TRANSESOPHAGEAL: CPT | Mod: 26

## 2021-03-29 PROCEDURE — 99232 SBSQ HOSP IP/OBS MODERATE 35: CPT | Mod: 25

## 2021-03-29 PROCEDURE — 93010 ELECTROCARDIOGRAM REPORT: CPT | Mod: 77

## 2021-03-29 PROCEDURE — 76377 3D RENDER W/INTRP POSTPROCES: CPT | Mod: 26

## 2021-03-29 RX ORDER — JNJ-78436735 50000000000 [PFU]/.5ML
0.5 SUSPENSION INTRAMUSCULAR ONCE
Refills: 0 | Status: COMPLETED | OUTPATIENT
Start: 2021-03-30 | End: 2021-03-30

## 2021-03-29 RX ADMIN — Medication 1: at 17:26

## 2021-03-29 RX ADMIN — ATORVASTATIN CALCIUM 80 MILLIGRAM(S): 80 TABLET, FILM COATED ORAL at 21:33

## 2021-03-29 RX ADMIN — Medication 81 MILLIGRAM(S): at 17:24

## 2021-03-29 RX ADMIN — Medication 50 MILLIGRAM(S): at 17:24

## 2021-03-29 RX ADMIN — HEPARIN SODIUM 1000 UNIT(S)/HR: 5000 INJECTION INTRAVENOUS; SUBCUTANEOUS at 07:39

## 2021-03-29 RX ADMIN — CLOPIDOGREL BISULFATE 75 MILLIGRAM(S): 75 TABLET, FILM COATED ORAL at 17:25

## 2021-03-29 RX ADMIN — Medication 50 MILLIGRAM(S): at 05:15

## 2021-03-29 NOTE — PRE-ANESTHESIA EVALUATION ADULT - NSANTHPMHFT_GEN_ALL_CORE
55yo man with hx of mech MVR, RHODA on CPAP, severe biv dysfunction, presenting in afib for ONEYDA and DCCV. ET<4 METS. Other pmh listed above.

## 2021-03-29 NOTE — PROGRESS NOTE ADULT - SUBJECTIVE AND OBJECTIVE BOX
Patient seen and examined at bedside.    Overnight Events: No acute events overnight, patient remains in atrial flutter, rates well controlled 70-80s, NPO for planned ONEYDA/DCCV today.     Review Of Systems: No chest pain, shortness of breath, or palpitations            Current Meds:  aspirin enteric coated 81 milliGRAM(s) Oral daily  atorvastatin 80 milliGRAM(s) Oral at bedtime  chlorhexidine 2% Cloths 1 Application(s) Topical <User Schedule>  clopidogrel Tablet 75 milliGRAM(s) Oral daily  heparin  Infusion.  Unit(s)/Hr IV Continuous <Continuous>  insulin lispro (ADMELOG) corrective regimen sliding scale   SubCutaneous three times a day before meals  insulin lispro (ADMELOG) corrective regimen sliding scale   SubCutaneous at bedtime  metoprolol tartrate 50 milliGRAM(s) Oral two times a day  metoprolol tartrate Injectable 5 milliGRAM(s) IV Push every 8 hours PRN  pantoprazole    Tablet 40 milliGRAM(s) Oral before breakfast      Vitals:  T(F): 97.3 (03-29), Max: 98.1 (03-28)  HR: 96 (03-29) (63 - 108)  BP: 110/66 (03-29) (101/71 - 128/82)  RR: 18 (03-29)  SpO2: 96% (03-29)  I&O's Summary    28 Mar 2021 07:01  -  29 Mar 2021 07:00  --------------------------------------------------------  IN: 960 mL / OUT: 0 mL / NET: 960 mL    GENERAL: No acute distress, well-developed  CHEST/LUNG: Clear to auscultation bilaterally; No wheeze, equal breath sounds bilaterally   HEART: Regular tach, mechanical valve sounds, no mrg   ABDOMEN: Soft, Nontender, Nondistended; Bowel sounds present  EXTREMITIES:  No clubbing, cyanosis, or edema  PSYCH: Nl behavior, nl affect  NEUROLOGY: AAOx3, non-focal, cranial nerves intact                        13.6   5.41  )-----------( 220      ( 29 Mar 2021 07:06 )             43.0     03-29    136  |  101  |  19  ----------------------------<  110<H>  4.2   |  21<L>  |  0.94    Ca    9.2      29 Mar 2021 07:06      PTT - ( 29 Mar 2021 07:06 )  PTT:72.5 sec  CARDIAC MARKERS ( 26 Mar 2021 08:38 )  573 ng/L / x     / x     / 579 U/L / x     / 27.9 ng/mL  CARDIAC MARKERS ( 26 Mar 2021 00:19 )  749 ng/L / x     / x     / 656 U/L / x     / 35.1 ng/mL  CARDIAC MARKERS ( 25 Mar 2021 14:57 )  166 ng/L / x     / x     / 518 U/L / x     / 15.6 ng/mL    Conclusions:  Patient tachycardic to  bpm during the study.  1. Mechanical prosthetic mitral valve replacement. No  mitral valve regurgitation seen. Mean transmitral valve  gradient equals 7-8 mm Hg. (HRabout 130s bpm)  2. Aortic valve not well visualized; appears calcified. No  aortic valve regurgitation seen.  3. Endocardial visualization enhanced with intravenous  injection of Ultrasonic Enhancing Agent (Definity) Severe  global left ventricular systolic dysfunction. No left  ventricular thrombus.  4. The right ventricle is not well visualized; grossly  normal right ventricular size with decreased right  ventricular systolic function.  *** No previous Echo exam.      56 year old male, PMH HTN, HLD, DM, RHODA on CPAP, RHD s/p mitral valve replacement in 2002 and afib on coumadin s/p multiple cardioversions presenting with chest pain, found to have inferior STEMI s/p RCA KALIN and thrombectomy, EP consulted for persistent narrow complex tach in 120s, likely typical flutter although at risk for atypical circuits given cardiac surgery. Currently asymptomatic.    - continue to monitor on telemetry   - beta blockade as tolerated   - continue anticoagulation as written   - will plan for ONEYDA / DCCV today  - reasonable to pursue ablation however will defer in the setting of acute MI, can follow up with EP as outpatient for further discussion  Patient seen and examined at bedside.    Overnight Events: No acute events overnight, patient remains in atrial flutter, rates well controlled 70-80s, NPO for planned ONEYDA/DCCV today.     Review Of Systems: No chest pain, shortness of breath, or palpitations            Current Meds:  aspirin enteric coated 81 milliGRAM(s) Oral daily  atorvastatin 80 milliGRAM(s) Oral at bedtime  chlorhexidine 2% Cloths 1 Application(s) Topical <User Schedule>  clopidogrel Tablet 75 milliGRAM(s) Oral daily  heparin  Infusion.  Unit(s)/Hr IV Continuous <Continuous>  insulin lispro (ADMELOG) corrective regimen sliding scale   SubCutaneous three times a day before meals  insulin lispro (ADMELOG) corrective regimen sliding scale   SubCutaneous at bedtime  metoprolol tartrate 50 milliGRAM(s) Oral two times a day  metoprolol tartrate Injectable 5 milliGRAM(s) IV Push every 8 hours PRN  pantoprazole    Tablet 40 milliGRAM(s) Oral before breakfast      Vitals:  T(F): 97.3 (03-29), Max: 98.1 (03-28)  HR: 96 (03-29) (63 - 108)  BP: 110/66 (03-29) (101/71 - 128/82)  RR: 18 (03-29)  SpO2: 96% (03-29)  I&O's Summary    28 Mar 2021 07:01  -  29 Mar 2021 07:00  --------------------------------------------------------  IN: 960 mL / OUT: 0 mL / NET: 960 mL    GENERAL: No acute distress, well-developed  CHEST/LUNG: Clear to auscultation bilaterally; No wheeze, equal breath sounds bilaterally   HEART: Regular tach, mechanical valve sounds, no mrg   ABDOMEN: Soft, Nontender, Nondistended; Bowel sounds present  EXTREMITIES:  No clubbing, cyanosis, or edema  PSYCH: Nl behavior, nl affect  NEUROLOGY: AAOx3, non-focal, cranial nerves intact                        13.6   5.41  )-----------( 220      ( 29 Mar 2021 07:06 )             43.0     03-29    136  |  101  |  19  ----------------------------<  110<H>  4.2   |  21<L>  |  0.94    Ca    9.2      29 Mar 2021 07:06      PTT - ( 29 Mar 2021 07:06 )  PTT:72.5 sec  CARDIAC MARKERS ( 26 Mar 2021 08:38 )  573 ng/L / x     / x     / 579 U/L / x     / 27.9 ng/mL  CARDIAC MARKERS ( 26 Mar 2021 00:19 )  749 ng/L / x     / x     / 656 U/L / x     / 35.1 ng/mL  CARDIAC MARKERS ( 25 Mar 2021 14:57 )  166 ng/L / x     / x     / 518 U/L / x     / 15.6 ng/mL    Conclusions:  Patient tachycardic to  bpm during the study.  1. Mechanical prosthetic mitral valve replacement. No  mitral valve regurgitation seen. Mean transmitral valve  gradient equals 7-8 mm Hg. (HRabout 130s bpm)  2. Aortic valve not well visualized; appears calcified. No  aortic valve regurgitation seen.  3. Endocardial visualization enhanced with intravenous  injection of Ultrasonic Enhancing Agent (Definity) Severe  global left ventricular systolic dysfunction. No left  ventricular thrombus.  4. The right ventricle is not well visualized; grossly  normal right ventricular size with decreased right  ventricular systolic function.  *** No previous Echo exam.      56 year old male, PMH HTN, HLD, DM, RHODA on CPAP, RHD s/p mitral valve replacement in 2002 and afib on coumadin s/p multiple cardioversions presenting with chest pain, found to have inferior STEMI s/p RCA KALIN and thrombectomy, EP consulted for persistent narrow complex tach in 120s, likely typical flutter although at risk for atypical circuits given cardiac surgery. Currently asymptomatic.    - continue to monitor on telemetry   - beta blockade as tolerated   - continue anticoagulation as written  - s/p ONEYDA / DCCV   - reasonable to pursue ablation however will defer in the setting of acute MI, can follow up with EP (Dr. Back) as outpatient for further discussion   - EP will sign off, please re consult with further concerns

## 2021-03-29 NOTE — PROGRESS NOTE ADULT - ASSESSMENT
56 year old male, with PMH HTN, HLD, DM, RHODA on CPAP, Rheumatoid Heart Disease s/p mitral valve replacement in 2002 and Afib on coumadin, admitted to CCU on 3/25  following a RCA KALIN and thrombectomy. The patient was in his usual state of health until the night prior to admission, when he felt very fatigued. This morning when he awoke, he was feeling significant chest pain which he described as burning. He also felt short of breath and very nauseous, but did not vomit.  He took a cab to the [facility] ED. He was found to be tachycardic to 129, EKG with ST elevations in interior leads. He was given morphine, aspirin 325, Brilinta and nitroglycerin. He was transferred to [facility], where was found to have 100% occlusion in the proximal RCA. He received one KALIN and a thrombectomy.  EP team has seen patient for  persistent narrow complex tachycardia. Plan  for ONEYDA and DCCV on 3/29.

## 2021-03-29 NOTE — PROGRESS NOTE ADULT - PROBLEM SELECTOR PLAN 1
RCA KALIN and thrombectomy  TTE shows severe global LV systolic dysfunction. EF 25-30%.  cont Atorvastatin , Metoprolol , ASA and Plavix   Cardio follow up   Cardiologist ( Sunday Goode )

## 2021-03-29 NOTE — PROGRESS NOTE ADULT - PROBLEM SELECTOR PLAN 2
S/p previous cardio versions; repeat 3/29  Cont Metoprolol 50 mg  twice a day with prn pushes-- Metoprolol 5mg IV push PRN for HR > 120-130's. S/p previous cardio versions; repeat 3/29  Cont Metoprolol 50 mg  twice a day with prn pushes-- Metoprolol 5mg IV push PRN for HR > 120-130's.  c/w heparin drip

## 2021-03-29 NOTE — PROGRESS NOTE ADULT - SUBJECTIVE AND OBJECTIVE BOX
Patient no new complaints.    GENERAL: No fevers, no chills.  EYES: No blurry vision,  No photophobia  ENT: No sore throat.  No dysphagia  Cardiovascular: No chest pain, palpitations, orthopnea  Pulmonary: No cough, no wheezing. No shortness of breath  Gastrointestinal: No abdominal pain, no diarrhea, no constipation.  Musculoskeletal: No weakness.  No myalgias.  Dermatology:  No rashes.  Neuro: No Headache.  No vertigo.  No dizziness.  Psych: No anxiety, no depression.  Denies suicidal thoughts.    MEDICATIONS  (STANDING):  aspirin enteric coated 81 milliGRAM(s) Oral daily  atorvastatin 80 milliGRAM(s) Oral at bedtime  chlorhexidine 2% Cloths 1 Application(s) Topical <User Schedule>  clopidogrel Tablet 75 milliGRAM(s) Oral daily  heparin  Infusion.  Unit(s)/Hr (15 mL/Hr) IV Continuous <Continuous>  insulin lispro (ADMELOG) corrective regimen sliding scale   SubCutaneous three times a day before meals  insulin lispro (ADMELOG) corrective regimen sliding scale   SubCutaneous at bedtime  metoprolol tartrate 50 milliGRAM(s) Oral two times a day  pantoprazole    Tablet 40 milliGRAM(s) Oral before breakfast    MEDICATIONS  (PRN):  metoprolol tartrate Injectable 5 milliGRAM(s) IV Push every 8 hours PRN HR>130    Vital Signs Last 24 Hrs  T(C): 36.3 (29 Mar 2021 14:00), Max: 36.5 (28 Mar 2021 20:36)  T(F): 97.4 (29 Mar 2021 14:00), Max: 97.7 (28 Mar 2021 20:36)  HR: 82 (29 Mar 2021 14:00) (82 - 96)  BP: 117/81 (29 Mar 2021 14:00) (110/66 - 117/81)  BP(mean): --  RR: 18 (29 Mar 2021 14:00) (18 - 18)  SpO2: 99% (29 Mar 2021 14:00) (96% - 99%)    PHYSICAL EXAM:   GENERAL: NAD, well-developed  HEAD:  Atraumatic, Normocephalic  EYES: conjunctiva and sclera clear  NECK: Supple   CHEST/LUNG: Clear to auscultation bilaterally; No wheeze  HEART: S1S2 normal. irregular rate and rhythm; No murmurs, rubs, or gallops  ABDOMEN: Soft, Nontender, Nondistended; Bowel sounds present  EXTREMITIES:  No clubbing, cyanosis, or edema  PSYCH/Neuro: AAOx3. Non-focal.   SKIN: No rashes or lesions    .  LABS:                         13.6   5.41  )-----------( 220      ( 29 Mar 2021 07:06 )             43.0     03-29    136  |  101  |  19  ----------------------------<  110<H>  4.2   |  21<L>  |  0.94    Ca    9.2      29 Mar 2021 07:06      PTT - ( 29 Mar 2021 07:06 )  PTT:72.5 sec          RADIOLOGY, EKG & ADDITIONAL TESTS: Reviewed.

## 2021-03-30 VITALS
RESPIRATION RATE: 18 BRPM | HEART RATE: 76 BPM | TEMPERATURE: 98 F | OXYGEN SATURATION: 98 % | SYSTOLIC BLOOD PRESSURE: 126 MMHG | DIASTOLIC BLOOD PRESSURE: 79 MMHG

## 2021-03-30 LAB
ANION GAP SERPL CALC-SCNC: 11 MMOL/L — SIGNIFICANT CHANGE UP (ref 5–17)
APTT BLD: 75.1 SEC — HIGH (ref 27.5–35.5)
BUN SERPL-MCNC: 15 MG/DL — SIGNIFICANT CHANGE UP (ref 7–23)
CALCIUM SERPL-MCNC: 8.6 MG/DL — SIGNIFICANT CHANGE UP (ref 8.4–10.5)
CHLORIDE SERPL-SCNC: 102 MMOL/L — SIGNIFICANT CHANGE UP (ref 96–108)
CO2 SERPL-SCNC: 22 MMOL/L — SIGNIFICANT CHANGE UP (ref 22–31)
CREAT SERPL-MCNC: 0.82 MG/DL — SIGNIFICANT CHANGE UP (ref 0.5–1.3)
GLUCOSE BLDC GLUCOMTR-MCNC: 111 MG/DL — HIGH (ref 70–99)
GLUCOSE BLDC GLUCOMTR-MCNC: 144 MG/DL — HIGH (ref 70–99)
GLUCOSE SERPL-MCNC: 86 MG/DL — SIGNIFICANT CHANGE UP (ref 70–99)
HCT VFR BLD CALC: 40.6 % — SIGNIFICANT CHANGE UP (ref 39–50)
HGB BLD-MCNC: 12.6 G/DL — LOW (ref 13–17)
MCHC RBC-ENTMCNC: 25.7 PG — LOW (ref 27–34)
MCHC RBC-ENTMCNC: 31 GM/DL — LOW (ref 32–36)
MCV RBC AUTO: 82.7 FL — SIGNIFICANT CHANGE UP (ref 80–100)
NRBC # BLD: 0 /100 WBCS — SIGNIFICANT CHANGE UP (ref 0–0)
PLATELET # BLD AUTO: 208 K/UL — SIGNIFICANT CHANGE UP (ref 150–400)
POTASSIUM SERPL-MCNC: 4.1 MMOL/L — SIGNIFICANT CHANGE UP (ref 3.5–5.3)
POTASSIUM SERPL-SCNC: 4.1 MMOL/L — SIGNIFICANT CHANGE UP (ref 3.5–5.3)
RBC # BLD: 4.91 M/UL — SIGNIFICANT CHANGE UP (ref 4.2–5.8)
RBC # FLD: 14.3 % — SIGNIFICANT CHANGE UP (ref 10.3–14.5)
SODIUM SERPL-SCNC: 135 MMOL/L — SIGNIFICANT CHANGE UP (ref 135–145)
WBC # BLD: 5.94 K/UL — SIGNIFICANT CHANGE UP (ref 3.8–10.5)
WBC # FLD AUTO: 5.94 K/UL — SIGNIFICANT CHANGE UP (ref 3.8–10.5)

## 2021-03-30 PROCEDURE — 84443 ASSAY THYROID STIM HORMONE: CPT

## 2021-03-30 PROCEDURE — 92978 ENDOLUMINL IVUS OCT C 1ST: CPT | Mod: RC

## 2021-03-30 PROCEDURE — C1887: CPT

## 2021-03-30 PROCEDURE — 93458 L HRT ARTERY/VENTRICLE ANGIO: CPT | Mod: 59

## 2021-03-30 PROCEDURE — 86769 SARS-COV-2 COVID-19 ANTIBODY: CPT

## 2021-03-30 PROCEDURE — 85027 COMPLETE CBC AUTOMATED: CPT

## 2021-03-30 PROCEDURE — 99153 MOD SED SAME PHYS/QHP EA: CPT

## 2021-03-30 PROCEDURE — C1753: CPT

## 2021-03-30 PROCEDURE — C1769: CPT

## 2021-03-30 PROCEDURE — 93325 DOPPLER ECHO COLOR FLOW MAPG: CPT

## 2021-03-30 PROCEDURE — 80053 COMPREHEN METABOLIC PANEL: CPT

## 2021-03-30 PROCEDURE — 83036 HEMOGLOBIN GLYCOSYLATED A1C: CPT

## 2021-03-30 PROCEDURE — 0031A: CPT

## 2021-03-30 PROCEDURE — 93312 ECHO TRANSESOPHAGEAL: CPT

## 2021-03-30 PROCEDURE — 99239 HOSP IP/OBS DSCHRG MGMT >30: CPT

## 2021-03-30 PROCEDURE — 82962 GLUCOSE BLOOD TEST: CPT

## 2021-03-30 PROCEDURE — 80061 LIPID PANEL: CPT

## 2021-03-30 PROCEDURE — 83735 ASSAY OF MAGNESIUM: CPT

## 2021-03-30 PROCEDURE — 93320 DOPPLER ECHO COMPLETE: CPT

## 2021-03-30 PROCEDURE — 86901 BLOOD TYPING SEROLOGIC RH(D): CPT

## 2021-03-30 PROCEDURE — 99152 MOD SED SAME PHYS/QHP 5/>YRS: CPT

## 2021-03-30 PROCEDURE — 93005 ELECTROCARDIOGRAM TRACING: CPT

## 2021-03-30 PROCEDURE — 84100 ASSAY OF PHOSPHORUS: CPT

## 2021-03-30 PROCEDURE — C1725: CPT

## 2021-03-30 PROCEDURE — C1874: CPT

## 2021-03-30 PROCEDURE — 85730 THROMBOPLASTIN TIME PARTIAL: CPT

## 2021-03-30 PROCEDURE — C1894: CPT

## 2021-03-30 PROCEDURE — 86900 BLOOD TYPING SEROLOGIC ABO: CPT

## 2021-03-30 PROCEDURE — 76377 3D RENDER W/INTRP POSTPROCES: CPT

## 2021-03-30 PROCEDURE — 92973 PRQ TRLUML C MCHN ASP THRMBC: CPT | Mod: RC

## 2021-03-30 PROCEDURE — 82553 CREATINE MB FRACTION: CPT

## 2021-03-30 PROCEDURE — C1757: CPT

## 2021-03-30 PROCEDURE — 80048 BASIC METABOLIC PNL TOTAL CA: CPT

## 2021-03-30 PROCEDURE — 99233 SBSQ HOSP IP/OBS HIGH 50: CPT

## 2021-03-30 PROCEDURE — 84484 ASSAY OF TROPONIN QUANT: CPT

## 2021-03-30 PROCEDURE — 86850 RBC ANTIBODY SCREEN: CPT

## 2021-03-30 PROCEDURE — C8929: CPT

## 2021-03-30 PROCEDURE — C9606: CPT | Mod: RC

## 2021-03-30 PROCEDURE — 71045 X-RAY EXAM CHEST 1 VIEW: CPT

## 2021-03-30 PROCEDURE — U0003: CPT

## 2021-03-30 PROCEDURE — 83605 ASSAY OF LACTIC ACID: CPT

## 2021-03-30 PROCEDURE — 92960 CARDIOVERSION ELECTRIC EXT: CPT

## 2021-03-30 PROCEDURE — 82550 ASSAY OF CK (CPK): CPT

## 2021-03-30 PROCEDURE — 85025 COMPLETE CBC W/AUTO DIFF WBC: CPT

## 2021-03-30 RX ORDER — AMLODIPINE BESYLATE 2.5 MG/1
1 TABLET ORAL
Qty: 0 | Refills: 0 | DISCHARGE

## 2021-03-30 RX ORDER — SIMVASTATIN 20 MG/1
1 TABLET, FILM COATED ORAL
Qty: 0 | Refills: 0 | DISCHARGE

## 2021-03-30 RX ORDER — METOPROLOL TARTRATE 50 MG
1 TABLET ORAL
Qty: 60 | Refills: 0
Start: 2021-03-30 | End: 2021-04-28

## 2021-03-30 RX ORDER — PANTOPRAZOLE SODIUM 20 MG/1
1 TABLET, DELAYED RELEASE ORAL
Qty: 30 | Refills: 0
Start: 2021-03-30 | End: 2021-04-28

## 2021-03-30 RX ORDER — WARFARIN SODIUM 2.5 MG/1
1 TABLET ORAL
Qty: 0 | Refills: 0 | DISCHARGE

## 2021-03-30 RX ORDER — ASPIRIN/CALCIUM CARB/MAGNESIUM 324 MG
1 TABLET ORAL
Qty: 30 | Refills: 0
Start: 2021-03-30 | End: 2021-04-28

## 2021-03-30 RX ORDER — LISINOPRIL 2.5 MG/1
1 TABLET ORAL
Qty: 30 | Refills: 0
Start: 2021-03-30 | End: 2021-04-28

## 2021-03-30 RX ORDER — APIXABAN 2.5 MG/1
1 TABLET, FILM COATED ORAL
Qty: 60 | Refills: 0
Start: 2021-03-30 | End: 2021-04-28

## 2021-03-30 RX ORDER — METOPROLOL TARTRATE 50 MG
1 TABLET ORAL
Qty: 0 | Refills: 0 | DISCHARGE

## 2021-03-30 RX ORDER — CLOPIDOGREL BISULFATE 75 MG/1
1 TABLET, FILM COATED ORAL
Qty: 30 | Refills: 0
Start: 2021-03-30 | End: 2021-04-28

## 2021-03-30 RX ORDER — APIXABAN 2.5 MG/1
5 TABLET, FILM COATED ORAL EVERY 12 HOURS
Refills: 0 | Status: DISCONTINUED | OUTPATIENT
Start: 2021-03-30 | End: 2021-03-30

## 2021-03-30 RX ORDER — ATORVASTATIN CALCIUM 80 MG/1
1 TABLET, FILM COATED ORAL
Qty: 30 | Refills: 0
Start: 2021-03-30 | End: 2021-04-28

## 2021-03-30 RX ORDER — ASPIRIN/CALCIUM CARB/MAGNESIUM 324 MG
0 TABLET ORAL
Qty: 0 | Refills: 0 | DISCHARGE

## 2021-03-30 RX ORDER — AMIODARONE HYDROCHLORIDE 400 MG/1
1 TABLET ORAL
Qty: 0 | Refills: 0 | DISCHARGE

## 2021-03-30 RX ADMIN — Medication 50 MILLIGRAM(S): at 05:26

## 2021-03-30 RX ADMIN — Medication 81 MILLIGRAM(S): at 11:35

## 2021-03-30 RX ADMIN — CLOPIDOGREL BISULFATE 75 MILLIGRAM(S): 75 TABLET, FILM COATED ORAL at 11:35

## 2021-03-30 RX ADMIN — HEPARIN SODIUM 1000 UNIT(S)/HR: 5000 INJECTION INTRAVENOUS; SUBCUTANEOUS at 08:20

## 2021-03-30 RX ADMIN — CHLORHEXIDINE GLUCONATE 1 APPLICATION(S): 213 SOLUTION TOPICAL at 06:34

## 2021-03-30 RX ADMIN — PANTOPRAZOLE SODIUM 40 MILLIGRAM(S): 20 TABLET, DELAYED RELEASE ORAL at 05:26

## 2021-03-30 RX ADMIN — JNJ-78436735 0.5 MILLILITER(S): 50000000000 SUSPENSION INTRAMUSCULAR at 11:40

## 2021-03-30 NOTE — CHART NOTE - NSCHARTNOTEFT_GEN_A_CORE
Patient is a 56y old  Male who presents with a chief complaint of Afib (30 Mar 2021 17:29)      Vital Signs Last 24 Hrs  T(C): 36.6 (30 Mar 2021 12:06), Max: 36.6 (29 Mar 2021 20:00)  T(F): 97.9 (30 Mar 2021 12:06), Max: 97.9 (30 Mar 2021 12:06)  HR: 76 (30 Mar 2021 12:06) (71 - 86)  BP: 126/79 (30 Mar 2021 12:06) (104/62 - 134/91)  BP(mean): --  RR: 18 (30 Mar 2021 12:06) (18 - 18)  SpO2: 98% (30 Mar 2021 12:06) (98% - 100%)      Labs:                          12.6   5.94  )-----------( 208      ( 30 Mar 2021 06:51 )             40.6     03-30    135  |  102  |  15  ----------------------------<  86  4.1   |  22  |  0.82    Ca    8.6      30 Mar 2021 06:51              Radiology:    Physical Exam:  General: WN/WD NAD  Neurology: A&Ox3, nonfocal, BAE x 4  Head:  Normocephalic, atraumatic  Respiratory: CTA B/L  CV: RRR, S1S2, no murmur  Abdominal: Soft, NT, ND no palpable mass  MSK: No edema, + peripheral pulses, FROM all 4 extremity    Discharge Note and Plan: discussed with Dr Mata patient medically stable for discharge   Cardiology appreciated   reviewed medications with Bernadette Mata for discharge   >Follow up with   >  >  >

## 2021-03-30 NOTE — PROGRESS NOTE ADULT - PROVIDER SPECIALTY LIST ADULT
CCU
Cardiology
Electrophysiology
Hospitalist
Hospitalist
CCU
Cardiology
Hospitalist

## 2021-03-30 NOTE — PROGRESS NOTE ADULT - ASSESSMENT
56 M w/ HTN, HLD, DM, RHODA on CPAP, RHD s/p mitral valve replacement in 2002 and afib on coumadin a/w STEMI s/p KALIN to RCA pending cardioversion for aflutter.     #CAD  -c/w asa, plavix, lipitor, metoprolol 75mg BID  -can begin lisinopril 2.5mg qd    #Aflutter  -s/p ONEYDA/cardioversion, currently in NSR  -c/w hep gtt   -likely start eliquis 5mg bid; will discuss w/ attending re: appropriate triple therapy duration for discharge planning    ANMOL Elena MD  Cardiology Fellow  Text or Call: [phone]  For all New Consults and Questions:  [email or web address]   Login: [email or web address] 56 M w/ HTN, HLD, DM, RHODA on CPAP, RHD s/p mitral valve replacement in 2002 and afib on coumadin a/w STEMI s/p KALIN to RCA pending cardioversion for aflutter.     #CAD  -c/w asa, plavix, lipitor, metoprolol 75mg BID  -can begin lisinopril 2.5mg qd    #Aflutter  -s/p ONEYDA/cardioversion, currently in NSR  -c/w hep gtt   -likely start eliquis 5mg bid    Patient should c/w triple therapy (ASA,plavix, eliquis) for one month, then can discontinue ASA and c/w plavix/eliquis. Patient should, however, f/u w/ Dr Davidson or outpatient cardiologist w/i 1-2 weeks for further mgmt.    ANMOL Elena MD  Cardiology Fellow  Text or Call: [phone]  For all New Consults and Questions:  [email or web address]   Login: [email or web address]

## 2021-03-30 NOTE — PROGRESS NOTE ADULT - PROBLEM SELECTOR PLAN 3
Pt does not want any CPAP in the hosp   monitor SPO2
Pt does not want any CPAP in the hosp   monitor SPO2
Pt uses CPAP at night but has not used for one month because  the CPAP was broken   Pt does not want any CPAP in the hosp   monitor SPO2

## 2021-03-30 NOTE — PROGRESS NOTE ADULT - ATTENDING COMMENTS
Recurrent (likely) atypical flutter in the context of acute presentation for IW STEMI. Now in sinus s/p DCCV. Outpt f/u for discussion of anti-arrhythmic therapy via AAD or ablation.
Plan for cardioversion tomorrow  NPO after midnight please      Jennifer
Thaddeus Travis MD, MPH, IRA, RPVI, FACC  Cardiovascular Specialist   [facility] ([facility])  [facility]  c: [phone] (text preferred and/or call)  e: [email or web address]  For all [facility] Cardiology and Cardiovascular Surgery on-service contact/call information, go to [email or web address] and use "[email or web address]" to login.  For outpatient Cardiology appointments, call  [phone] to arrange with a colleague; I do not have outpatient Cardiology clinic.
disposition: nataliia/ dccv 3/29, dc recs per EP  discussed with ANDRE Mata D.O.  Hospitalist Pager # [phone]
disposition: DC 3/30  1 HOUR SPENT IN PREPARATION OF DISCHARGE  discussed with NP Yuni Mata D.O.  Hospitalist Pager # [phone]

## 2021-03-30 NOTE — PROGRESS NOTE ADULT - SUBJECTIVE AND OBJECTIVE BOX
Patient no new complaints.    GENERAL: No fevers, no chills.  EYES: No blurry vision,  No photophobia  ENT: No sore throat.  No dysphagia  Cardiovascular: No chest pain, palpitations, orthopnea  Pulmonary: No cough, no wheezing. No shortness of breath  Gastrointestinal: No abdominal pain, no diarrhea, no constipation.  Musculoskeletal: No weakness.  No myalgias.  Dermatology:  No rashes.  Neuro: No Headache.  No vertigo.  No dizziness.  Psych: No anxiety, no depression.  Denies suicidal thoughts.    MEDICATIONS  (STANDING):  apixaban 5 milliGRAM(s) Oral every 12 hours  aspirin enteric coated 81 milliGRAM(s) Oral daily  atorvastatin 80 milliGRAM(s) Oral at bedtime  chlorhexidine 2% Cloths 1 Application(s) Topical <User Schedule>  clopidogrel Tablet 75 milliGRAM(s) Oral daily  insulin lispro (ADMELOG) corrective regimen sliding scale   SubCutaneous three times a day before meals  insulin lispro (ADMELOG) corrective regimen sliding scale   SubCutaneous at bedtime  metoprolol tartrate 50 milliGRAM(s) Oral two times a day  pantoprazole    Tablet 40 milliGRAM(s) Oral before breakfast    MEDICATIONS  (PRN):  metoprolol tartrate Injectable 5 milliGRAM(s) IV Push every 8 hours PRN HR>130    Vital Signs Last 24 Hrs  T(C): 36.6 (30 Mar 2021 12:06), Max: 36.6 (29 Mar 2021 20:00)  T(F): 97.9 (30 Mar 2021 12:06), Max: 97.9 (30 Mar 2021 12:06)  HR: 76 (30 Mar 2021 12:06) (71 - 86)  BP: 126/79 (30 Mar 2021 12:06) (104/62 - 134/91)  BP(mean): --  RR: 18 (30 Mar 2021 12:06) (18 - 18)  SpO2: 98% (30 Mar 2021 12:06) (98% - 100%)    PHYSICAL EXAM:   GENERAL: NAD, well-developed  HEAD:  Atraumatic, Normocephalic  EYES: conjunctiva and sclera clear  NECK: Supple   CHEST/LUNG: Clear to auscultation bilaterally; No wheeze  HEART: S1S2 normal. irregular rate and rhythm; No murmurs, rubs, or gallops  ABDOMEN: Soft, Nontender, Nondistended; Bowel sounds present  EXTREMITIES:  No clubbing, cyanosis, or edema  PSYCH/Neuro: AAOx3. Non-focal.   SKIN: No rashes or lesions    .  LABS:                         12.6   5.94  )-----------( 208      ( 30 Mar 2021 06:51 )             40.6     03-30    135  |  102  |  15  ----------------------------<  86  4.1   |  22  |  0.82    Ca    8.6      30 Mar 2021 06:51      PTT - ( 30 Mar 2021 06:51 )  PTT:75.1 sec          RADIOLOGY, EKG & ADDITIONAL TESTS: Reviewed.

## 2021-03-30 NOTE — DISCHARGE NOTE PROVIDER - CARE PROVIDER_API CALL
Nathen Back)  Cardiac Electrophysiology; Cardiology  [address] [place] [address]  Phone: [phone]  Fax: ()-  Follow Up Time: 2 weeks

## 2021-03-30 NOTE — DISCHARGE NOTE NURSING/CASE MANAGEMENT/SOCIAL WORK - PATIENT PORTAL LINK FT
You can access the FollowMyHealth Patient Portal offered by [facility] by registering at the following website: [email or web address]. By joining [facility]’s FollowMyHealth portal, you will also be able to view your health information using other applications (apps) compatible with our system.

## 2021-03-30 NOTE — PROGRESS NOTE ADULT - PROBLEM SELECTOR PLAN 2
S/p previous cardio versions; repeat 3/29  Cont Metoprolol 50 mg  twice a day PRN for HR > 120-130's.  start eliquis

## 2021-03-30 NOTE — DISCHARGE NOTE PROVIDER - HOSPITAL COURSE
The patient is a 56 year old male, PMH HTN, HLD, DM, RHODA on CPAP, RHD s/p mitral valve replacement in 2002 and afib on coumadin, presenting to CCU following a RCA KALIN and thrombectomy. The patient was in his usual state of health until yesterday evening, when he felt very fatigued. This morning when he awoke, he was feeling significant chest pain which he describes as burning. He also felt short of breath and very nauseous, but did not vomit. He took a cab to the [facility] ED. He was found to be tachycardic to 129, EKG with ST elevations in interior leads. He was given morphine, aspirin 325, Brilinta and nitroglycerin. He was transferred to [facility], where was found to have 100% occlusion in the proximal RCA. He received one KALIN and a thrombectomy.    The patient is a 56 year old male, PMH HTN, HLD, DM, RHODA on CPAP, RHD s/p mitral valve replacement in 2002 and afib on coumadin, presenting to CCU following a RCA KALIN and thrombectomy. The patient was in his usual state of health until yesterday evening, when he felt very fatigued. This morning when he awoke, he was feeling significant chest pain which he describes as burning. He also felt short of breath and very nauseous, but did not vomit. He took a cab to the [facility] ED. He was found to be tachycardic to 129, EKG with ST elevations in interior leads. He was given morphine, aspirin 325, Brilinta and nitroglycerin. He was transferred to [facility], where was found to have 100% occlusion in the proximal RCA. He received one KALIN and a thrombectomy. received one KALIN and a thrombectomy.  EP team has seen patient for  persistent narrow complex tachycardia. Plan  for ONEYDA and DCCV on 3/29.   ST elevation myocardial infarction involving right coronary artery. -RCA KALIN and thrombectomy  TTE shows severe global LV systolic dysfunction. EF 25-30%.  cont Atorvastatin , Metoprolol , ASA and Plavix   Cardiologist ( Dr Valerio, [facility] ).   longstanding persistent atrial fibrillation.  Plan: S/p previous cardio versions; repeat 3/29  Cont Metoprolol 50 mg  twice a day with prn pushes-- Metoprolol 5mg IV push PRN for HR > 120-130's.  :-Type 2 diabetes mellitus with other specified complication, without long-term current use of insulin.  Plan: Was on Metformin at home   Glucose is stable  cont insulin scale and monitor glucose.    The patient is a 56 year old male, PMH HTN, HLD, DM, RHODA on CPAP, RHD s/p mitral valve replacement in 2002 and afib on coumadin, presenting to CCU following a RCA KALIN and thrombectomy. The patient was in his usual state of health until yesterday evening, when he felt very fatigued. This morning when he awoke, he was feeling significant chest pain which he describes as burning. He also felt short of breath and very nauseous, but did not vomit. He took a cab to the [facility] ED. He was found to be tachycardic to 129, EKG with ST elevations in interior leads. He was given morphine, aspirin 325, Brilinta and nitroglycerin. He was transferred to [facility], where was found to have 100% occlusion in the proximal RCA. He received one KALIN and a thrombectomy. received one KALIN and a thrombectomy.  EP team has seen patient for  persistent narrow complex tachycardia. Plan  for ONEYDA and DCCV on 3/29.   ST elevation myocardial infarction involving right coronary artery. -RCA KALIN and thrombectomy  TTE shows severe global LV systolic dysfunction. EF 25-30%.  cont Atorvastatin , Metoprolol , ASA and Plavix   Cardiologist ( Dr Valerio, [facility] ).   longstanding persistent atrial fibrillation.  Plan: S/p previous cardio versions; repeat 3/29  Cont Metoprolol 50 mg  twice a day with prn pushes-- Metoprolol 5mg IV push PRN for HR > 120-130's.  :-Type 2 diabetes mellitus with other specified complication, without long-term current use of insulin.  Plan: Was on Metformin at home   Glucose is stable  cont insulin scale and monitor glucose.   Medical stable for discharge

## 2021-03-30 NOTE — PROGRESS NOTE ADULT - PROBLEM SELECTOR PLAN 5
As per RX , patient was Amlodipine and Metoprolol   Cont to monitor BP  -Note: patient would like COVID vaccine prior to DC.
- bp stable  - c/w metoprolol BID
As per RX , patient was Amlodipine and Metoprolol   COnt to monitor BP
- bp stable  - c/w metoprolol BID
As per RX , patient was Amlodipine and Metoprolol   Cont to monitor BP  -Note: patient would like COVID vaccine prior to DC.

## 2021-03-30 NOTE — DISCHARGE NOTE PROVIDER - NSDCMRMEDTOKEN_GEN_ALL_CORE_FT
amiodarone 200 mg oral tablet: 1 tab(s) orally once a day  amLODIPine 5 mg oral tablet: 1 tab(s) orally once a day  aspirin 81 mg oral tablet:   Metoprolol Tartrate 50 mg oral tablet: 1 tab(s) orally 2 times a day  simvastatin 20 mg oral tablet: 1 tab(s) orally once a day (at bedtime)  warfarin 4 mg oral tablet: 1 tab(s) orally on Thursdays   warfarin 5 mg oral tablet: 1 tab(s) orally once a day except for Thursday    aspirin 81 mg oral delayed release tablet: 1 tab(s) orally once a day   for just 30 days ***  atorvastatin 80 mg oral tablet: 1 tab(s) orally once a day (at bedtime)  clopidogrel 75 mg oral tablet: 1 tab(s) orally once a day  Eliquis 5 mg oral tablet: 1 tab(s) orally 2 times a day   metoprolol tartrate 50 mg oral tablet: 1 tab(s) orally 2 times a day  pantoprazole 40 mg oral delayed release tablet: 1 tab(s) orally once a day (before a meal)   aspirin 81 mg oral delayed release tablet: 1 tab(s) orally once a day   for just 30 days ***  atorvastatin 80 mg oral tablet: 1 tab(s) orally once a day (at bedtime)  clopidogrel 75 mg oral tablet: 1 tab(s) orally once a day  Eliquis 5 mg oral tablet: 1 tab(s) orally 2 times a day   lisinopril 2.5 mg oral tablet: 1 tab(s) orally once a day   metoprolol tartrate 50 mg oral tablet: 1 tab(s) orally 2 times a day  pantoprazole 40 mg oral delayed release tablet: 1 tab(s) orally once a day (before a meal)

## 2021-03-30 NOTE — PROGRESS NOTE ADULT - SUBJECTIVE AND OBJECTIVE BOX
INTERVAL EVENTS: No o/n events. Denies CP, dyspnea, palpitations, presyncope, syncope, f/c/n/v.     REVIEW OF SYSTEMS:  Constitutional:     [X] negative [ ] fevers [ ] chills [ ] weight loss [ ] weight gain  HEENT:                  [X] negative [ ] dry eyes [ ] eye irritation [ ] postnasal drip [ ] nasal congestion  CV:                         [X] negative  [ ] chest pain [ ] orthopnea [ ] palpitations [ ] murmur  Resp:                     [X] negative [ ] cough [ ] shortness of breath [ ] wheezing [ ] sputum [ ] hemoptysis  GI:                          [X] negative [ ] nausea [ ] vomiting [ ] diarrhea [ ] constipation [ ] abd pain [ ] dysphagia   :                        [X] negative [ ] dysuria [ ] nocturia [ ] hematuria [ ] increased urinary frequency  MSK:                      [X] negative [ ] back pain [ ] myalgias [ ] arthralgias [ ] fracture  Skin:                       [X] negative [ ] rash [ ] itch  Neuro:                   [X] negative [ ] headache [ ] dizziness [ ] syncope [ ] weakness [ ] numbness  Psych:                    [X] negative [ ] anxiety [ ] depression  Endo:                     [X] negative [ ] diabetes [ ] thyroid problem  Heme/Lymph:      [X] negative [ ] anemia [ ] bleeding problem  Allergic/Immune: [X] negative [ ] itchy eyes [ ] nasal discharge [ ] hives [ ] angioedema    [X] All other systems negative or otherwise described above.  [ ] Unable to assess ROS because ________.    PAST MEDICAL & SURGICAL HISTORY:  Afib    Mild HTN    HTN (hypertension)    Diabetes    HLD (hyperlipidemia)    RHODA on CPAP    H/O mitral valve replacement      MEDICATIONS  (STANDING):  aspirin enteric coated 81 milliGRAM(s) Oral daily  atorvastatin 80 milliGRAM(s) Oral at bedtime  chlorhexidine 2% Cloths 1 Application(s) Topical <User Schedule>  clopidogrel Tablet 75 milliGRAM(s) Oral daily  coronavirus (EUA) Vaccine ([facility]) 0.5 milliLiter(s) IntraMuscular once  heparin  Infusion.  Unit(s)/Hr (15 mL/Hr) IV Continuous <Continuous>  insulin lispro (ADMELOG) corrective regimen sliding scale   SubCutaneous three times a day before meals  insulin lispro (ADMELOG) corrective regimen sliding scale   SubCutaneous at bedtime  metoprolol tartrate 50 milliGRAM(s) Oral two times a day  pantoprazole    Tablet 40 milliGRAM(s) Oral before breakfast    MEDICATIONS  (PRN):  metoprolol tartrate Injectable 5 milliGRAM(s) IV Push every 8 hours PRN HR>130    ICU Vital Signs Last 24 Hrs  T(C): 36.2 (30 Mar 2021 04:33), Max: 36.6 (29 Mar 2021 20:00)  T(F): 97.1 (30 Mar 2021 04:33), Max: 97.8 (29 Mar 2021 20:00)  HR: 71 (30 Mar 2021 04:33) (71 - 86)  BP: 104/62 (30 Mar 2021 04:33) (104/62 - 134/91)  BP(mean): --  ABP: --  ABP(mean): --  RR: 18 (29 Mar 2021 20:00) (18 - 18)  SpO2: 100% (29 Mar 2021 20:00) (99% - 100%)    Daily Height in cm: 167.64 (29 Mar 2021 11:22)    Daily Weight in k.3 (30 Mar 2021 04:33)    PHYSICAL EXAM:  GEN: Awake, alert. NAD.   HEENT: NCAT, PERRL, EOMI. Mucosa moist. No JVD.  RESP: CTA b/l  CV: RRR. Normal S1/S2. No m/r/g.  ABD: Soft. NT/ND. BS+  EXT: Warm. No edema, clubbing, or cyanosis.   NEURO: AAOx3. No focal deficits.     LABS:                        12.6   5.94  )-----------( 208      ( 30 Mar 2021 06:51 )             40.6     03-    135  |  102  |  15  ----------------------------<  86  4.1   |  22  |  0.82    Ca    8.6      30 Mar 2021 06:51          PTT - ( 30 Mar 2021 06:51 )  PTT:75.1 sec    I&O's Summary    29 Mar 2021 07:01  -  30 Mar 2021 07:00  --------------------------------------------------------  IN: 180 mL / OUT: 250 mL / NET: -70 mL      BNP    RADIOLOGY & ADDITIONAL STUDIES:    TELEMETRY: reviewed    EKG: reviewed    < from: Transesophageal Echocardiogram w/o TTE (21 @ 11:22) >  Conclusions:  1. Mechanical prosthetic mitral valve replacement. Peak  mitral valve gradient equals 11 mm Hg, mean transmitral  valve gradient equals 4 mm Hg, which is probably normal in  the setting of a mechanical prosthetic mitral valve  replacement.  2. Spontaneous echo contrast seen. No left atrial or left  atrial appendage thrombus.  3. Endocardium not well visualized; grossly  Severe global  left ventricular systolic dysfunction.  4. Normal right ventricular size with decreased right  ventricular systolic function.  5. Normal tricuspid valve. Mild-moderate tricuspid  regurgitation.  ------------------------------------------------------------------------  Confirmed on  3/29/2021 - 16:09:59 by ÁNGEL Huber  ------------------------------------------------------------------------    < end of copied text >      < from: Cardiac Cath Lab - Adult (21 @ 11:51) >  CORONARY VESSELS: The coronary circulation is right dominant.  LM:   --  LM: Angiography showed minor luminal irregularities with no flow  limiting lesions.  LAD:   --  LAD: Angiographyshowed minor luminal irregularities with no  flow limiting lesions.  CX:   --  Distal circumflex: Angiography showed moderate atherosclerosis.  RCA:   --  Mid RCA: There was a 100 % stenosis.  COMPLICATIONS: There were no complications.  SUMMARY:  Summary: Addendum 21: Case revised to correct patient MRN and Account  numbers.  DIAGNOSTIC RECOMMENDATIONS: shockwave was done to the proximal RCA which  was calcified circumferentially and did not expand completely with  stenting  INTERVENTIONALRECOMMENDATIONS: shockwave was done to the proximal RCA  which was calcified circumferentially and did not expand completely with  stenting  Prepared and signed by  Bryan Davidson M.D.  Signed 2021 10:04:30    < end of copied text >       INTERVAL EVENTS: No o/n events. Denies CP, dyspnea, palpitations, presyncope, syncope, f/c/n/v.     REVIEW OF SYSTEMS:  Constitutional:     [X] negative [ ] fevers [ ] chills [ ] weight loss [ ] weight gain  HEENT:                  [X] negative [ ] dry eyes [ ] eye irritation [ ] postnasal drip [ ] nasal congestion  CV:                         [X] negative  [ ] chest pain [ ] orthopnea [ ] palpitations [ ] murmur  Resp:                     [X] negative [ ] cough [ ] shortness of breath [ ] wheezing [ ] sputum [ ] hemoptysis  GI:                          [X] negative [ ] nausea [ ] vomiting [ ] diarrhea [ ] constipation [ ] abd pain [ ] dysphagia   :                        [X] negative [ ] dysuria [ ] nocturia [ ] hematuria [ ] increased urinary frequency  MSK:                      [X] negative [ ] back pain [ ] myalgias [ ] arthralgias [ ] fracture  Skin:                       [X] negative [ ] rash [ ] itch  Neuro:                   [X] negative [ ] headache [ ] dizziness [ ] syncope [ ] weakness [ ] numbness  Psych:                    [X] negative [ ] anxiety [ ] depression  Endo:                     [X] negative [ ] diabetes [ ] thyroid problem  Heme/Lymph:      [X] negative [ ] anemia [ ] bleeding problem  Allergic/Immune: [X] negative [ ] itchy eyes [ ] nasal discharge [ ] hives [ ] angioedema    [X] All other systems negative or otherwise described above.  [ ] Unable to assess ROS because ________.    PAST MEDICAL & SURGICAL HISTORY:  Afib    Mild HTN    HTN (hypertension)    Diabetes    HLD (hyperlipidemia)    RHODA on CPAP    H/O mitral valve replacement      MEDICATIONS  (STANDING):  aspirin enteric coated 81 milliGRAM(s) Oral daily  atorvastatin 80 milliGRAM(s) Oral at bedtime  chlorhexidine 2% Cloths 1 Application(s) Topical <User Schedule>  clopidogrel Tablet 75 milliGRAM(s) Oral daily  coronavirus (EUA) Vaccine ([employer]) 0.5 milliLiter(s) IntraMuscular once  heparin  Infusion.  Unit(s)/Hr (15 mL/Hr) IV Continuous <Continuous>  insulin lispro (ADMELOG) corrective regimen sliding scale   SubCutaneous three times a day before meals  insulin lispro (ADMELOG) corrective regimen sliding scale   SubCutaneous at bedtime  metoprolol tartrate 50 milliGRAM(s) Oral two times a day  pantoprazole    Tablet 40 milliGRAM(s) Oral before breakfast    MEDICATIONS  (PRN):  metoprolol tartrate Injectable 5 milliGRAM(s) IV Push every 8 hours PRN HR>130    ICU Vital Signs Last 24 Hrs  T(C): 36.2 (30 Mar 2021 04:33), Max: 36.6 (29 Mar 2021 20:00)  T(F): 97.1 (30 Mar 2021 04:33), Max: 97.8 (29 Mar 2021 20:00)  HR: 71 (30 Mar 2021 04:33) (71 - 86)  BP: 104/62 (30 Mar 2021 04:33) (104/62 - 134/91)  BP(mean): --  ABP: --  ABP(mean): --  RR: 18 (29 Mar 2021 20:00) (18 - 18)  SpO2: 100% (29 Mar 2021 20:00) (99% - 100%)    Daily Height in cm: 167.64 (29 Mar 2021 11:22)    Daily Weight in k.3 (30 Mar 2021 04:33)    PHYSICAL EXAM:  GEN: Awake, alert. NAD.   HEENT: NCAT, PERRL, EOMI. Mucosa moist. No JVD.  RESP: CTA b/l  CV: RRR. Normal S1/S2. No m/r/g.  ABD: Soft. NT/ND. BS+  EXT: Warm. No edema, clubbing, or cyanosis.   NEURO: AAOx3. No focal deficits.     LABS:                        12.6   5.94  )-----------( 208      ( 30 Mar 2021 06:51 )             40.6     03-    135  |  102  |  15  ----------------------------<  86  4.1   |  22  |  0.82    Ca    8.6      30 Mar 2021 06:51          PTT - ( 30 Mar 2021 06:51 )  PTT:75.1 sec    I&O's Summary    29 Mar 2021 07:01  -  30 Mar 2021 07:00  --------------------------------------------------------  IN: 180 mL / OUT: 250 mL / NET: -70 mL      BNP    RADIOLOGY & ADDITIONAL STUDIES:    TELEMETRY: reviewed    EKG: reviewed    < from: Transesophageal Echocardiogram w/o TTE (21 @ 11:22) >  Conclusions:  1. Mechanical prosthetic mitral valve replacement. Peak  mitral valve gradient equals 11 mm Hg, mean transmitral  valve gradient equals 4 mm Hg, which is probably normal in  the setting of a mechanical prosthetic mitral valve  replacement.  2. Spontaneous echo contrast seen. No left atrial or left  atrial appendage thrombus.  3. Endocardium not well visualized; grossly  Severe global  left ventricular systolic dysfunction.  4. Normal right ventricular size with decreased right  ventricular systolic function.  5. Normal tricuspid valve. Mild-moderate tricuspid  regurgitation.  ------------------------------------------------------------------------  Confirmed on  3/29/2021 - 16:09:59 by ÁNGEL Huber  ------------------------------------------------------------------------    < end of copied text >      < from: Cardiac Cath Lab - Adult (21 @ 11:51) >  CORONARY VESSELS: The coronary circulation is right dominant.  LM:   --  LM: Angiography showed minor luminal irregularities with no flow  limiting lesions.  LAD:   --  LAD: Angiographyshowed minor luminal irregularities with no  flow limiting lesions.  CX:   --  Distal circumflex: Angiography showed moderate atherosclerosis.  RCA:   --  Mid RCA: There was a 100 % stenosis.  COMPLICATIONS: There were no complications.  SUMMARY:  Summary: Addendum 21: Case revised to correct patient MRN and Account  numbers.  DIAGNOSTIC RECOMMENDATIONS: shockwave was done to the proximal RCA which  was calcified circumferentially and did not expand completely with  stenting  INTERVENTIONALRECOMMENDATIONS: shockwave was done to the proximal RCA  which was calcified circumferentially and did not expand completely with  stenting  Prepared and signed by  Bryan Davidson M.D.  Signed 2021 10:04:30    < end of copied text >

## 2021-03-30 NOTE — PROGRESS NOTE ADULT - PROBLEM SELECTOR PROBLEM 1
ST elevation myocardial infarction involving right coronary artery

## 2021-03-30 NOTE — DISCHARGE NOTE PROVIDER - NSDCCPCAREPLAN_GEN_ALL_CORE_FT
PRINCIPAL DISCHARGE DIAGNOSIS  Diagnosis: STEMI (ST elevation myocardial infarction)  Assessment and Plan of Treatment: Low salt, low fat diet.   Weight management.   Take medications as prescribed.    No smoking.  Follow up appointments with your doctor(s)  as instruced.        SECONDARY DISCHARGE DIAGNOSES  Diagnosis: Type 2 diabetes mellitus with other specified complication, without long-term current use of insulin  Assessment and Plan of Treatment: HgA1C this admission.  Make sure you get your HgA1c checked every three months.  If you take oral diabetes medications, check your blood glucose two times a day.  If you take insulin, check your blood glucose before meals and at bedtime.  It's important not to skip any meals.  Keep a log of your blood glucose results and always take it with you to your doctor appointments.  Keep a list of your current medications including injectables and over the counter medications and bring this medication list with you to all your doctor appointments.  If you have not seen your opthalmologist this year call for appointment.  Check your feet daily for redness, sores, or openings. Do not self treat. If no improvement in two days call your primary care physician for an appointment.  Low blood sugar (hypoglycemia) is a blood sugar below 70mg/dl. Check your blood sugar if you feel signs/symptoms of hypoglycemia. If your blood sugar is below 70 take 15 grams of carbohydrates (ex 4 oz of apple juice, 3-4 glucosr tablets, or 4-6 oz of regular soda) wait 15 minutes and repeat blood sugar to make sure it comes up above 70.  If your blood sugar is above 70 and you are due for a meal, have a meal.  If you are not due for a meal have a snack.  This snack helps keeps your blood sugar at a safe range.      Diagnosis: Longstanding persistent atrial fibrillation  Assessment and Plan of Treatment: continue with antigulation    Diagnosis: Essential hypertension  Assessment and Plan of Treatment: Low salt diet  Activity as tolerated.  Take all medication as prescribed.  Follow up with your medical doctor for routine blood pressure monitoring at your next visit.  Notify your doctor if you have any of the following symptoms:   Dizziness, Lightheadedness, Blurry vision, Headache, Chest pain, Shortness of breath       PRINCIPAL DISCHARGE DIAGNOSIS  Diagnosis: STEMI (ST elevation myocardial infarction)  Assessment and Plan of Treatment: Low salt, low fat diet.   Weight management.   Take medications as prescribed.    No smoking.  Follow up appointments with your doctor(s)  as instruced.        SECONDARY DISCHARGE DIAGNOSES  Diagnosis: Type 2 diabetes mellitus with other specified complication, without long-term current use of insulin  Assessment and Plan of Treatment: HgA1C this admission 5.9% . 3/25    Make sure you get your HgA1c checked every three months.  If you take oral diabetes medications, check your blood glucose two times a day.  If you take insulin, check your blood glucose before meals and at bedtime.  It's important not to skip any meals.  Keep a log of your blood glucose results and always take it with you to your doctor appointments.  Keep a list of your current medications including injectables and over the counter medications and bring this medication list with you to all your doctor appointments.  If you have not seen your opthalmologist this year call for appointment.  Check your feet daily for redness, sores, or openings. Do not self treat. If no improvement in two days call your primary care physician for an appointment.  Low blood sugar (hypoglycemia) is a blood sugar below 70mg/dl. Check your blood sugar if you feel signs/symptoms of hypoglycemia. If your blood sugar is below 70 take 15 grams of carbohydrates (ex 4 oz of apple juice, 3-4 glucosr tablets, or 4-6 oz of regular soda) wait 15 minutes and repeat blood sugar to make sure it comes up above 70.  If your blood sugar is above 70 and you are due for a meal, have a meal.  If you are not due for a meal have a snack.  This snack helps keeps your blood sugar at a safe range.      Diagnosis: Longstanding persistent atrial fibrillation  Assessment and Plan of Treatment: continue with antigulation    Diagnosis: Essential hypertension  Assessment and Plan of Treatment: Low salt diet  Activity as tolerated.  Take all medication as prescribed.  Follow up with your medical doctor for routine blood pressure monitoring at your next visit.  Notify your doctor if you have any of the following symptoms:   Dizziness, Lightheadedness, Blurry vision, Headache, Chest pain, Shortness of breath

## 2021-03-30 NOTE — PROGRESS NOTE ADULT - PROBLEM SELECTOR PROBLEM 4
Type 2 diabetes mellitus with other specified complication, without long-term current use of insulin

## 2021-03-30 NOTE — DISCHARGE NOTE NURSING/CASE MANAGEMENT/SOCIAL WORK - NSDCVIVACCINE_GEN_ALL_CORE_FT
No Vaccines Administered. Severe acute respiratory syndrome coronavirus 2 (SARS-CoV-2) (Coronavirus disease [COVID-19]) vaccine , 2021/3/30 11:40 , Dereck Fraser (KAYLA)

## 2021-03-30 NOTE — PROGRESS NOTE ADULT - ASSESSMENT
56 year old male, with PMH HTN, HLD, DM, RHODA on CPAP, Rheumatoid Heart Disease s/p mitral valve replacement in 2002 and Afib on coumadin, admitted to CCU on 3/25  following a RCA KALIN and thrombectomy. The patient was in his usual state of health until the night prior to admission, when he felt very fatigued. This morning when he awoke, he was feeling significant chest pain which he described as burning. He also felt short of breath and very nauseous, but did not vomit.  He took a cab to the [facility] ED. He was found to be tachycardic to 129, EKG with ST elevations in interior leads. He was given morphine, aspirin 325, Brilinta and nitroglycerin. He was transferred to [facility], where was found to have 100% occlusion in the proximal RCA. He received one KALIN and a thrombectomy.  EP team has seen patient for  persistent narrow complex tachycardia. Plan  for ONEYDA and DCCV on 3/29.

## 2021-03-31 NOTE — CHART NOTE - NSCHARTNOTEFT_GEN_A_CORE
We were called by patient's outpatient cardiologist that patient was switched from Coumadin to Eliquis by medicine/cardiology. Patient with a mechanical mitral valve. Attempted to reach patient but unsuccessful. Spoke with patient's wife and stated patient needs to be put back on Coumadin and be bridged and recommended patient come to ER for admission to be put back on Coumadin with bridging coverage. She will discuss with her

## 2021-08-25 PROBLEM — Z00.00 ENCOUNTER FOR PREVENTIVE HEALTH EXAMINATION: Status: ACTIVE | Noted: 2021-08-25

## 2022-08-17 NOTE — DISCHARGE NOTE PROVIDER - [id number]_CARD_ALL_CORE
Yes... Thalidomide Pregnancy And Lactation Text: This medication is Pregnancy Category X and is absolutely contraindicated during pregnancy. It is unknown if it is excreted in breast milk.
